# Patient Record
Sex: MALE | Race: WHITE | NOT HISPANIC OR LATINO | Employment: OTHER | URBAN - NONMETROPOLITAN AREA
[De-identification: names, ages, dates, MRNs, and addresses within clinical notes are randomized per-mention and may not be internally consistent; named-entity substitution may affect disease eponyms.]

---

## 2022-07-07 PROCEDURE — 93321 DOPPLER ECHO F-UP/LMTD STD: CPT | Performed by: INTERNAL MEDICINE

## 2022-07-07 PROCEDURE — 93325 DOPPLER ECHO COLOR FLOW MAPG: CPT | Performed by: INTERNAL MEDICINE

## 2022-07-07 PROCEDURE — 93308 TTE F-UP OR LMTD: CPT | Performed by: INTERNAL MEDICINE

## 2022-07-26 ENCOUNTER — OUTSIDE FACILITY SERVICE (OUTPATIENT)
Dept: CARDIOLOGY | Facility: CLINIC | Age: 72
End: 2022-07-26

## 2023-01-20 ENCOUNTER — HOSPITAL ENCOUNTER (INPATIENT)
Facility: HOSPITAL | Age: 73
LOS: 3 days | Discharge: HOME | DRG: 244 | End: 2023-01-24
Attending: EMERGENCY MEDICINE | Admitting: INTERNAL MEDICINE
Payer: MEDICARE

## 2023-01-20 ENCOUNTER — APPOINTMENT (OUTPATIENT)
Dept: GENERAL RADIOLOGY | Facility: HOSPITAL | Age: 73
DRG: 244 | End: 2023-01-20
Payer: MEDICARE

## 2023-01-20 ENCOUNTER — APPOINTMENT (OUTPATIENT)
Dept: CT IMAGING | Facility: HOSPITAL | Age: 73
DRG: 244 | End: 2023-01-20
Payer: MEDICARE

## 2023-01-20 DIAGNOSIS — R00.1 SYMPTOMATIC BRADYCARDIA: ICD-10-CM

## 2023-01-20 DIAGNOSIS — I44.1 AV BLOCK, MOBITZ 2: Primary | ICD-10-CM

## 2023-01-20 PROBLEM — I10 HTN (HYPERTENSION): Status: ACTIVE | Noted: 2023-01-20

## 2023-01-20 PROBLEM — G47.33 OSA (OBSTRUCTIVE SLEEP APNEA): Status: ACTIVE | Noted: 2023-01-20

## 2023-01-20 PROBLEM — I65.22 CAROTID ARTERY STENOSIS, SYMPTOMATIC, LEFT: Status: ACTIVE | Noted: 2023-01-20

## 2023-01-20 PROBLEM — E66.9 OBESE: Status: ACTIVE | Noted: 2023-01-20

## 2023-01-20 PROBLEM — E78.2 MIXED HYPERLIPIDEMIA: Status: ACTIVE | Noted: 2023-01-20

## 2023-01-20 LAB
ALBUMIN SERPL-MCNC: 4.7 G/DL (ref 3.5–5.2)
ALBUMIN/GLOB SERPL: 1.4 G/DL
ALP SERPL-CCNC: 89 U/L (ref 39–117)
ALT SERPL W P-5'-P-CCNC: 23 U/L (ref 1–41)
ANION GAP SERPL CALCULATED.3IONS-SCNC: 14 MMOL/L (ref 5–15)
AST SERPL-CCNC: 23 U/L (ref 1–40)
BASOPHILS # BLD AUTO: 0.03 10*3/MM3 (ref 0–0.2)
BASOPHILS NFR BLD AUTO: 0.4 % (ref 0–1.5)
BILIRUB SERPL-MCNC: 0.7 MG/DL (ref 0–1.2)
BUN SERPL-MCNC: 16 MG/DL (ref 8–23)
BUN/CREAT SERPL: 14.8 (ref 7–25)
CALCIUM SPEC-SCNC: 9.8 MG/DL (ref 8.6–10.5)
CHLORIDE SERPL-SCNC: 100 MMOL/L (ref 98–107)
CO2 SERPL-SCNC: 26 MMOL/L (ref 22–29)
CREAT SERPL-MCNC: 1.08 MG/DL (ref 0.76–1.27)
DEPRECATED RDW RBC AUTO: 43.8 FL (ref 37–54)
EGFRCR SERPLBLD CKD-EPI 2021: 73.8 ML/MIN/1.73
EOSINOPHIL # BLD AUTO: 0.13 10*3/MM3 (ref 0–0.4)
EOSINOPHIL NFR BLD AUTO: 1.7 % (ref 0.3–6.2)
ERYTHROCYTE [DISTWIDTH] IN BLOOD BY AUTOMATED COUNT: 12.6 % (ref 12.3–15.4)
GLOBULIN UR ELPH-MCNC: 3.4 GM/DL
GLUCOSE SERPL-MCNC: 98 MG/DL (ref 65–99)
HCT VFR BLD AUTO: 43.6 % (ref 37.5–51)
HGB BLD-MCNC: 14.4 G/DL (ref 13–17.7)
HOLD SPECIMEN: NORMAL
IMM GRANULOCYTES # BLD AUTO: 0.03 10*3/MM3 (ref 0–0.05)
IMM GRANULOCYTES NFR BLD AUTO: 0.4 % (ref 0–0.5)
LYMPHOCYTES # BLD AUTO: 2.24 10*3/MM3 (ref 0.7–3.1)
LYMPHOCYTES NFR BLD AUTO: 29.1 % (ref 19.6–45.3)
MAGNESIUM SERPL-MCNC: 1.9 MG/DL (ref 1.6–2.4)
MCH RBC QN AUTO: 31.3 PG (ref 26.6–33)
MCHC RBC AUTO-ENTMCNC: 33 G/DL (ref 31.5–35.7)
MCV RBC AUTO: 94.8 FL (ref 79–97)
MONOCYTES # BLD AUTO: 0.84 10*3/MM3 (ref 0.1–0.9)
MONOCYTES NFR BLD AUTO: 10.9 % (ref 5–12)
NEUTROPHILS NFR BLD AUTO: 4.44 10*3/MM3 (ref 1.7–7)
NEUTROPHILS NFR BLD AUTO: 57.5 % (ref 42.7–76)
NRBC BLD AUTO-RTO: 0 /100 WBC (ref 0–0.2)
NT-PROBNP SERPL-MCNC: 459.2 PG/ML (ref 0–900)
PLATELET # BLD AUTO: 192 10*3/MM3 (ref 140–450)
PMV BLD AUTO: 10.4 FL (ref 6–12)
POTASSIUM SERPL-SCNC: 4.3 MMOL/L (ref 3.5–5.2)
PROT SERPL-MCNC: 8.1 G/DL (ref 6–8.5)
QT INTERVAL: 480 MS
QTC INTERVAL: 376 MS
RBC # BLD AUTO: 4.6 10*6/MM3 (ref 4.14–5.8)
SODIUM SERPL-SCNC: 140 MMOL/L (ref 136–145)
T4 FREE SERPL-MCNC: 1.18 NG/DL (ref 0.93–1.7)
TROPONIN T SERPL-MCNC: <0.01 NG/ML (ref 0–0.03)
TSH SERPL DL<=0.05 MIU/L-ACNC: 2.23 UIU/ML (ref 0.27–4.2)
WBC NRBC COR # BLD: 7.71 10*3/MM3 (ref 3.4–10.8)
WHOLE BLOOD HOLD COAG: NORMAL
WHOLE BLOOD HOLD SPECIMEN: NORMAL

## 2023-01-20 PROCEDURE — 4A03X5D MEASUREMENT OF ARTERIAL FLOW, INTRACRANIAL, EXTERNAL APPROACH: ICD-10-PCS | Performed by: STUDENT IN AN ORGANIZED HEALTH CARE EDUCATION/TRAINING PROGRAM

## 2023-01-20 PROCEDURE — 84443 ASSAY THYROID STIM HORMONE: CPT | Performed by: EMERGENCY MEDICINE

## 2023-01-20 PROCEDURE — G0378 HOSPITAL OBSERVATION PER HR: HCPCS

## 2023-01-20 PROCEDURE — 93005 ELECTROCARDIOGRAM TRACING: CPT

## 2023-01-20 PROCEDURE — 83880 ASSAY OF NATRIURETIC PEPTIDE: CPT | Performed by: EMERGENCY MEDICINE

## 2023-01-20 PROCEDURE — 84439 ASSAY OF FREE THYROXINE: CPT | Performed by: EMERGENCY MEDICINE

## 2023-01-20 PROCEDURE — 70450 CT HEAD/BRAIN W/O DYE: CPT

## 2023-01-20 PROCEDURE — 99223 1ST HOSP IP/OBS HIGH 75: CPT | Performed by: PHYSICIAN ASSISTANT

## 2023-01-20 PROCEDURE — 85025 COMPLETE CBC W/AUTO DIFF WBC: CPT | Performed by: EMERGENCY MEDICINE

## 2023-01-20 PROCEDURE — 70498 CT ANGIOGRAPHY NECK: CPT

## 2023-01-20 PROCEDURE — 70496 CT ANGIOGRAPHY HEAD: CPT

## 2023-01-20 PROCEDURE — 93005 ELECTROCARDIOGRAM TRACING: CPT | Performed by: EMERGENCY MEDICINE

## 2023-01-20 PROCEDURE — 83735 ASSAY OF MAGNESIUM: CPT | Performed by: EMERGENCY MEDICINE

## 2023-01-20 PROCEDURE — 99285 EMERGENCY DEPT VISIT HI MDM: CPT

## 2023-01-20 PROCEDURE — 36415 COLL VENOUS BLD VENIPUNCTURE: CPT

## 2023-01-20 PROCEDURE — 80053 COMPREHEN METABOLIC PANEL: CPT | Performed by: EMERGENCY MEDICINE

## 2023-01-20 PROCEDURE — 0 IOPAMIDOL PER 1 ML: Performed by: EMERGENCY MEDICINE

## 2023-01-20 PROCEDURE — 71045 X-RAY EXAM CHEST 1 VIEW: CPT

## 2023-01-20 PROCEDURE — 84484 ASSAY OF TROPONIN QUANT: CPT | Performed by: EMERGENCY MEDICINE

## 2023-01-20 RX ORDER — SODIUM CHLORIDE 0.9 % (FLUSH) 0.9 %
10 SYRINGE (ML) INJECTION AS NEEDED
Status: DISCONTINUED | OUTPATIENT
Start: 2023-01-20 | End: 2023-01-24 | Stop reason: HOSPADM

## 2023-01-20 RX ORDER — CEFAZOLIN SODIUM 2 G/100ML
2 INJECTION, SOLUTION INTRAVENOUS ONCE
Status: COMPLETED | OUTPATIENT
Start: 2023-01-23 | End: 2023-01-23

## 2023-01-20 RX ORDER — LISINOPRIL 40 MG/1
40 TABLET ORAL DAILY
COMMUNITY

## 2023-01-20 RX ORDER — ONDANSETRON 2 MG/ML
4 INJECTION INTRAMUSCULAR; INTRAVENOUS EVERY 6 HOURS PRN
Status: DISCONTINUED | OUTPATIENT
Start: 2023-01-20 | End: 2023-01-24 | Stop reason: HOSPADM

## 2023-01-20 RX ORDER — NITROGLYCERIN 0.4 MG/1
0.4 TABLET SUBLINGUAL
Status: DISCONTINUED | OUTPATIENT
Start: 2023-01-20 | End: 2023-01-24 | Stop reason: HOSPADM

## 2023-01-20 RX ORDER — ASPIRIN 325 MG
325 TABLET ORAL DAILY
COMMUNITY

## 2023-01-20 RX ORDER — OMEPRAZOLE 20 MG/1
20 CAPSULE, DELAYED RELEASE ORAL DAILY
COMMUNITY

## 2023-01-20 RX ORDER — SODIUM CHLORIDE 9 MG/ML
40 INJECTION, SOLUTION INTRAVENOUS AS NEEDED
Status: DISCONTINUED | OUTPATIENT
Start: 2023-01-20 | End: 2023-01-24 | Stop reason: HOSPADM

## 2023-01-20 RX ORDER — PRAVASTATIN SODIUM 20 MG
20 TABLET ORAL DAILY
COMMUNITY

## 2023-01-20 RX ORDER — SODIUM CHLORIDE 0.9 % (FLUSH) 0.9 %
3 SYRINGE (ML) INJECTION EVERY 12 HOURS SCHEDULED
Status: DISCONTINUED | OUTPATIENT
Start: 2023-01-20 | End: 2023-01-24 | Stop reason: HOSPADM

## 2023-01-20 RX ORDER — ACETAMINOPHEN 325 MG/1
650 TABLET ORAL EVERY 4 HOURS PRN
Status: DISCONTINUED | OUTPATIENT
Start: 2023-01-20 | End: 2023-01-23 | Stop reason: SDUPTHER

## 2023-01-20 RX ORDER — CEFAZOLIN SODIUM 2 G/100ML
2 INJECTION, SOLUTION INTRAVENOUS ONCE
Status: DISCONTINUED | OUTPATIENT
Start: 2023-01-20 | End: 2023-01-20

## 2023-01-20 RX ADMIN — Medication 3 ML: at 21:18

## 2023-01-20 RX ADMIN — Medication 10 ML: at 20:37

## 2023-01-20 RX ADMIN — IOPAMIDOL 75 ML: 755 INJECTION, SOLUTION INTRAVENOUS at 15:03

## 2023-01-21 PROCEDURE — 99232 SBSQ HOSP IP/OBS MODERATE 35: CPT | Performed by: STUDENT IN AN ORGANIZED HEALTH CARE EDUCATION/TRAINING PROGRAM

## 2023-01-21 RX ORDER — ASPIRIN 81 MG/1
81 TABLET ORAL ONCE
Status: COMPLETED | OUTPATIENT
Start: 2023-01-21 | End: 2023-01-21

## 2023-01-21 RX ORDER — PRAVASTATIN SODIUM 20 MG
20 TABLET ORAL NIGHTLY
Status: COMPLETED | OUTPATIENT
Start: 2023-01-21 | End: 2023-01-21

## 2023-01-21 RX ORDER — LISINOPRIL 40 MG/1
40 TABLET ORAL
Status: COMPLETED | OUTPATIENT
Start: 2023-01-21 | End: 2023-01-21

## 2023-01-21 RX ADMIN — Medication 3 ML: at 21:09

## 2023-01-21 RX ADMIN — SERTRALINE HYDROCHLORIDE 50 MG: 50 TABLET ORAL at 21:07

## 2023-01-21 RX ADMIN — PRAVASTATIN SODIUM 20 MG: 20 TABLET ORAL at 21:07

## 2023-01-21 RX ADMIN — Medication 3 ML: at 08:07

## 2023-01-21 RX ADMIN — LISINOPRIL 40 MG: 40 TABLET ORAL at 21:07

## 2023-01-21 RX ADMIN — ASPIRIN 81 MG: 81 TABLET, COATED ORAL at 21:07

## 2023-01-22 ENCOUNTER — APPOINTMENT (OUTPATIENT)
Dept: CARDIOLOGY | Facility: HOSPITAL | Age: 73
DRG: 244 | End: 2023-01-22
Payer: MEDICARE

## 2023-01-22 LAB
BH CV ECHO MEAS - AO MAX PG: 15.4 MMHG
BH CV ECHO MEAS - AO MEAN PG: 8.2 MMHG
BH CV ECHO MEAS - AO ROOT DIAM: 3.8 CM
BH CV ECHO MEAS - AO V2 MAX: 196.4 CM/SEC
BH CV ECHO MEAS - AO V2 VTI: 50.1 CM
BH CV ECHO MEAS - AVA(I,D): 2.21 CM2
BH CV ECHO MEAS - EDV(CUBED): 144.4 ML
BH CV ECHO MEAS - EDV(MOD-SP2): 125.8 ML
BH CV ECHO MEAS - EDV(MOD-SP4): 125.5 ML
BH CV ECHO MEAS - EF(MOD-BP): 65 %
BH CV ECHO MEAS - EF(MOD-SP2): 71.4 %
BH CV ECHO MEAS - EF(MOD-SP4): 61.4 %
BH CV ECHO MEAS - ESV(CUBED): 62.1 ML
BH CV ECHO MEAS - ESV(MOD-SP2): 36 ML
BH CV ECHO MEAS - ESV(MOD-SP4): 48.5 ML
BH CV ECHO MEAS - FS: 24.5 %
BH CV ECHO MEAS - IVS/LVPW: 0.99 CM
BH CV ECHO MEAS - IVSD: 1.2 CM
BH CV ECHO MEAS - LA DIMENSION: 4.9 CM
BH CV ECHO MEAS - LAT PEAK E' VEL: 15 CM/SEC
BH CV ECHO MEAS - LV DIASTOLIC VOL/BSA (35-75): 54.1 CM2
BH CV ECHO MEAS - LV MASS(C)D: 253.5 GRAMS
BH CV ECHO MEAS - LV MAX PG: 7.6 MMHG
BH CV ECHO MEAS - LV MEAN PG: 3.7 MMHG
BH CV ECHO MEAS - LV SYSTOLIC VOL/BSA (12-30): 20.9 CM2
BH CV ECHO MEAS - LV V1 MAX: 138 CM/SEC
BH CV ECHO MEAS - LV V1 VTI: 36.5 CM
BH CV ECHO MEAS - LVIDD: 5.2 CM
BH CV ECHO MEAS - LVIDS: 4 CM
BH CV ECHO MEAS - LVOT AREA: 3 CM2
BH CV ECHO MEAS - LVOT DIAM: 1.96 CM
BH CV ECHO MEAS - LVPWD: 1.21 CM
BH CV ECHO MEAS - MED PEAK E' VEL: 13 CM/SEC
BH CV ECHO MEAS - MV A MAX VEL: 53.5 CM/SEC
BH CV ECHO MEAS - MV DEC SLOPE: 471.5 CM/SEC2
BH CV ECHO MEAS - MV DEC TIME: 0.26 MSEC
BH CV ECHO MEAS - MV E MAX VEL: 120.6 CM/SEC
BH CV ECHO MEAS - MV E/A: 2.25
BH CV ECHO MEAS - MV MAX PG: 8.5 MMHG
BH CV ECHO MEAS - MV MEAN PG: 2.07 MMHG
BH CV ECHO MEAS - MV P1/2T: 75 MSEC
BH CV ECHO MEAS - MV V2 VTI: 69.4 CM
BH CV ECHO MEAS - MVA(VTI): 1.6 CM2
BH CV ECHO MEAS - PA ACC TIME: 0.21 SEC
BH CV ECHO MEAS - PA PR(ACCEL): -15.2 MMHG
BH CV ECHO MEAS - PA V2 MAX: 115.1 CM/SEC
BH CV ECHO MEAS - RAP SYSTOLE: 3 MMHG
BH CV ECHO MEAS - RVSP: 26.8 MMHG
BH CV ECHO MEAS - SI(MOD-SP2): 38.7 ML/M2
BH CV ECHO MEAS - SI(MOD-SP4): 33.2 ML/M2
BH CV ECHO MEAS - SV(LVOT): 110.8 ML
BH CV ECHO MEAS - SV(MOD-SP2): 89.8 ML
BH CV ECHO MEAS - SV(MOD-SP4): 77 ML
BH CV ECHO MEAS - TAPSE (>1.6): 2.6 CM
BH CV ECHO MEAS - TR MAX PG: 23.8 MMHG
BH CV ECHO MEAS - TR MAX VEL: 244.1 CM/SEC
BH CV ECHO MEASUREMENTS AVERAGE E/E' RATIO: 8.61
BH CV VAS BP LEFT ARM: NORMAL MMHG
BH CV XLRA - RV BASE: 3.9 CM
BH CV XLRA - RV LENGTH: 6.8 CM
BH CV XLRA - RV MID: 3.5 CM
BH CV XLRA - TDI S': 11 CM/SEC
IVRT: 88 MSEC
LEFT ATRIUM VOLUME INDEX: 34.9 ML/M2
MAXIMAL PREDICTED HEART RATE: 150 BPM
STRESS TARGET HR: 128 BPM

## 2023-01-22 PROCEDURE — 99232 SBSQ HOSP IP/OBS MODERATE 35: CPT | Performed by: STUDENT IN AN ORGANIZED HEALTH CARE EDUCATION/TRAINING PROGRAM

## 2023-01-22 PROCEDURE — 93306 TTE W/DOPPLER COMPLETE: CPT | Performed by: INTERNAL MEDICINE

## 2023-01-22 PROCEDURE — 93306 TTE W/DOPPLER COMPLETE: CPT

## 2023-01-22 RX ADMIN — Medication 3 ML: at 20:20

## 2023-01-22 RX ADMIN — Medication 3 ML: at 08:37

## 2023-01-23 ENCOUNTER — APPOINTMENT (OUTPATIENT)
Dept: GENERAL RADIOLOGY | Facility: HOSPITAL | Age: 73
DRG: 244 | End: 2023-01-23
Payer: MEDICARE

## 2023-01-23 PROCEDURE — C1898 LEAD, PMKR, OTHER THAN TRANS: HCPCS | Performed by: STUDENT IN AN ORGANIZED HEALTH CARE EDUCATION/TRAINING PROGRAM

## 2023-01-23 PROCEDURE — C1892 INTRO/SHEATH,FIXED,PEEL-AWAY: HCPCS | Performed by: STUDENT IN AN ORGANIZED HEALTH CARE EDUCATION/TRAINING PROGRAM

## 2023-01-23 PROCEDURE — 93005 ELECTROCARDIOGRAM TRACING: CPT | Performed by: STUDENT IN AN ORGANIZED HEALTH CARE EDUCATION/TRAINING PROGRAM

## 2023-01-23 PROCEDURE — 33208 INSRT HEART PM ATRIAL & VENT: CPT | Performed by: STUDENT IN AN ORGANIZED HEALTH CARE EDUCATION/TRAINING PROGRAM

## 2023-01-23 PROCEDURE — C1785 PMKR, DUAL, RATE-RESP: HCPCS | Performed by: STUDENT IN AN ORGANIZED HEALTH CARE EDUCATION/TRAINING PROGRAM

## 2023-01-23 PROCEDURE — 71046 X-RAY EXAM CHEST 2 VIEWS: CPT

## 2023-01-23 PROCEDURE — 99152 MOD SED SAME PHYS/QHP 5/>YRS: CPT | Performed by: STUDENT IN AN ORGANIZED HEALTH CARE EDUCATION/TRAINING PROGRAM

## 2023-01-23 PROCEDURE — 25010000002 MIDAZOLAM PER 1 MG: Performed by: STUDENT IN AN ORGANIZED HEALTH CARE EDUCATION/TRAINING PROGRAM

## 2023-01-23 PROCEDURE — 02HK3JZ INSERTION OF PACEMAKER LEAD INTO RIGHT VENTRICLE, PERCUTANEOUS APPROACH: ICD-10-PCS | Performed by: STUDENT IN AN ORGANIZED HEALTH CARE EDUCATION/TRAINING PROGRAM

## 2023-01-23 PROCEDURE — 02H63JZ INSERTION OF PACEMAKER LEAD INTO RIGHT ATRIUM, PERCUTANEOUS APPROACH: ICD-10-PCS | Performed by: STUDENT IN AN ORGANIZED HEALTH CARE EDUCATION/TRAINING PROGRAM

## 2023-01-23 PROCEDURE — 25010000002 CEFAZOLIN IN DEXTROSE 2-4 GM/100ML-% SOLUTION: Performed by: PHYSICIAN ASSISTANT

## 2023-01-23 PROCEDURE — 0JH606Z INSERTION OF PACEMAKER, DUAL CHAMBER INTO CHEST SUBCUTANEOUS TISSUE AND FASCIA, OPEN APPROACH: ICD-10-PCS | Performed by: STUDENT IN AN ORGANIZED HEALTH CARE EDUCATION/TRAINING PROGRAM

## 2023-01-23 PROCEDURE — 25010000002 ONDANSETRON PER 1 MG: Performed by: STUDENT IN AN ORGANIZED HEALTH CARE EDUCATION/TRAINING PROGRAM

## 2023-01-23 PROCEDURE — 99153 MOD SED SAME PHYS/QHP EA: CPT | Performed by: STUDENT IN AN ORGANIZED HEALTH CARE EDUCATION/TRAINING PROGRAM

## 2023-01-23 PROCEDURE — 25010000002 FENTANYL CITRATE (PF) 50 MCG/ML SOLUTION: Performed by: STUDENT IN AN ORGANIZED HEALTH CARE EDUCATION/TRAINING PROGRAM

## 2023-01-23 DEVICE — LD PM TENDRIL STS 6F58CM 2088TC58: Type: IMPLANTABLE DEVICE | Site: HEART | Status: FUNCTIONAL

## 2023-01-23 DEVICE — LD PM TENDRIL STS 6F52CM 2088TC52: Type: IMPLANTABLE DEVICE | Site: HEART | Status: FUNCTIONAL

## 2023-01-23 DEVICE — GEN PM ASSURITY MRI DR RF PM2272: Type: IMPLANTABLE DEVICE | Site: HEART | Status: FUNCTIONAL

## 2023-01-23 RX ORDER — SODIUM CHLORIDE 9 MG/ML
INJECTION, SOLUTION INTRAVENOUS
Status: COMPLETED | OUTPATIENT
Start: 2023-01-23 | End: 2023-01-23

## 2023-01-23 RX ORDER — PRAVASTATIN SODIUM 20 MG
20 TABLET ORAL NIGHTLY
Status: DISCONTINUED | OUTPATIENT
Start: 2023-01-23 | End: 2023-01-24 | Stop reason: HOSPADM

## 2023-01-23 RX ORDER — PANTOPRAZOLE SODIUM 40 MG/1
40 TABLET, DELAYED RELEASE ORAL
Status: DISCONTINUED | OUTPATIENT
Start: 2023-01-24 | End: 2023-01-24 | Stop reason: HOSPADM

## 2023-01-23 RX ORDER — SODIUM CHLORIDE 0.9 % (FLUSH) 0.9 %
10 SYRINGE (ML) INJECTION AS NEEDED
Status: DISCONTINUED | OUTPATIENT
Start: 2023-01-23 | End: 2023-01-24 | Stop reason: HOSPADM

## 2023-01-23 RX ORDER — ACETAMINOPHEN 325 MG/1
650 TABLET ORAL EVERY 4 HOURS PRN
Status: DISCONTINUED | OUTPATIENT
Start: 2023-01-23 | End: 2023-01-24 | Stop reason: HOSPADM

## 2023-01-23 RX ORDER — ACETAMINOPHEN 650 MG/1
650 SUPPOSITORY RECTAL EVERY 4 HOURS PRN
Status: DISCONTINUED | OUTPATIENT
Start: 2023-01-23 | End: 2023-01-24 | Stop reason: HOSPADM

## 2023-01-23 RX ORDER — ONDANSETRON 2 MG/ML
INJECTION INTRAMUSCULAR; INTRAVENOUS
Status: DISCONTINUED | OUTPATIENT
Start: 2023-01-23 | End: 2023-01-23 | Stop reason: HOSPADM

## 2023-01-23 RX ORDER — BUPIVACAINE HYDROCHLORIDE 2.5 MG/ML
INJECTION, SOLUTION INFILTRATION; PERINEURAL
Status: DISCONTINUED | OUTPATIENT
Start: 2023-01-23 | End: 2023-01-23 | Stop reason: HOSPADM

## 2023-01-23 RX ORDER — LISINOPRIL 40 MG/1
40 TABLET ORAL DAILY
Status: DISCONTINUED | OUTPATIENT
Start: 2023-01-24 | End: 2023-01-24 | Stop reason: HOSPADM

## 2023-01-23 RX ORDER — SODIUM CHLORIDE 9 MG/ML
40 INJECTION, SOLUTION INTRAVENOUS AS NEEDED
Status: DISCONTINUED | OUTPATIENT
Start: 2023-01-23 | End: 2023-01-24 | Stop reason: HOSPADM

## 2023-01-23 RX ORDER — SODIUM CHLORIDE 0.9 % (FLUSH) 0.9 %
3 SYRINGE (ML) INJECTION EVERY 12 HOURS SCHEDULED
Status: DISCONTINUED | OUTPATIENT
Start: 2023-01-23 | End: 2023-01-24 | Stop reason: HOSPADM

## 2023-01-23 RX ORDER — MIDAZOLAM HYDROCHLORIDE 1 MG/ML
INJECTION INTRAMUSCULAR; INTRAVENOUS
Status: DISCONTINUED | OUTPATIENT
Start: 2023-01-23 | End: 2023-01-23 | Stop reason: HOSPADM

## 2023-01-23 RX ORDER — FENTANYL CITRATE 50 UG/ML
INJECTION, SOLUTION INTRAMUSCULAR; INTRAVENOUS
Status: DISCONTINUED | OUTPATIENT
Start: 2023-01-23 | End: 2023-01-23 | Stop reason: HOSPADM

## 2023-01-23 RX ORDER — LIDOCAINE HYDROCHLORIDE 5 MG/ML
INJECTION, SOLUTION INFILTRATION; PERINEURAL
Status: DISCONTINUED | OUTPATIENT
Start: 2023-01-23 | End: 2023-01-23 | Stop reason: HOSPADM

## 2023-01-23 RX ORDER — ASPIRIN 325 MG
162.5 TABLET ORAL DAILY
Status: DISCONTINUED | OUTPATIENT
Start: 2023-01-24 | End: 2023-01-24 | Stop reason: HOSPADM

## 2023-01-23 RX ORDER — LISINOPRIL 40 MG/1
40 TABLET ORAL ONCE
Status: COMPLETED | OUTPATIENT
Start: 2023-01-23 | End: 2023-01-23

## 2023-01-23 RX ADMIN — LISINOPRIL 40 MG: 40 TABLET ORAL at 20:55

## 2023-01-23 RX ADMIN — Medication 3 ML: at 20:57

## 2023-01-23 RX ADMIN — CEFAZOLIN SODIUM 2 G: 2 INJECTION, SOLUTION INTRAVENOUS at 17:33

## 2023-01-23 RX ADMIN — Medication 10 ML: at 20:56

## 2023-01-23 RX ADMIN — Medication 3 ML: at 20:56

## 2023-01-23 RX ADMIN — PRAVASTATIN SODIUM 20 MG: 20 TABLET ORAL at 20:55

## 2023-01-24 ENCOUNTER — READMISSION MANAGEMENT (OUTPATIENT)
Dept: CALL CENTER | Facility: HOSPITAL | Age: 73
End: 2023-01-24
Payer: MEDICARE

## 2023-01-24 VITALS
TEMPERATURE: 98.6 F | BODY MASS INDEX: 36.65 KG/M2 | HEIGHT: 70 IN | WEIGHT: 256 LBS | DIASTOLIC BLOOD PRESSURE: 81 MMHG | SYSTOLIC BLOOD PRESSURE: 138 MMHG | OXYGEN SATURATION: 95 % | RESPIRATION RATE: 18 BRPM | HEART RATE: 69 BPM

## 2023-01-24 LAB
QT INTERVAL: 0 MS
QTC INTERVAL: 0 MS

## 2023-01-24 PROCEDURE — 93005 ELECTROCARDIOGRAM TRACING: CPT | Performed by: INTERNAL MEDICINE

## 2023-01-24 RX ADMIN — LISINOPRIL 40 MG: 40 TABLET ORAL at 09:48

## 2023-01-24 RX ADMIN — ASPIRIN 162.5 MG: 325 TABLET ORAL at 09:46

## 2023-01-24 RX ADMIN — SERTRALINE HYDROCHLORIDE 50 MG: 50 TABLET ORAL at 09:46

## 2023-01-24 RX ADMIN — PANTOPRAZOLE SODIUM 40 MG: 40 TABLET, DELAYED RELEASE ORAL at 05:52

## 2023-01-25 NOTE — OUTREACH NOTE
Prep Survey    Flowsheet Row Responses   Religion facility patient discharged from? [facility]   Is LACE score < 7 ? No   Eligibility Readm Mgmt   Discharge diagnosis Mobitz type 2 second degree heart block   Does the patient have one of the following disease processes/diagnoses(primary or secondary)? Other   Does the patient have Home health ordered? No   Is there a DME ordered? No   Prep survey completed? Yes          KONSTANTIN JULIAN - Registered Nurse

## 2023-01-26 ENCOUNTER — READMISSION MANAGEMENT (OUTPATIENT)
Dept: CALL CENTER | Facility: HOSPITAL | Age: 73
End: 2023-01-26
Payer: MEDICARE

## 2023-01-26 NOTE — OUTREACH NOTE
Medical Week 1 Survey    Flowsheet Row Responses   [facility] patient discharged from? [place]   Does the patient have one of the following disease processes/diagnoses(primary or secondary)? Other   Week 1 attempt successful? No   Unsuccessful attempts Attempt 1          TIN PINA - Registered Nurse

## 2023-02-02 ENCOUNTER — READMISSION MANAGEMENT (OUTPATIENT)
Dept: CALL CENTER | Facility: HOSPITAL | Age: 73
End: 2023-02-02
Payer: MEDICARE

## 2023-02-02 LAB
QT INTERVAL: 438 MS
QTC INTERVAL: 475 MS

## 2023-02-02 NOTE — OUTREACH NOTE
Medical Week 2 Survey    Flowsheet Row Responses   [facility] patient discharged from? [place]   Does the patient have one of the following disease processes/diagnoses(primary or secondary)? Other   Week 2 attempt successful? No   Unsuccessful attempts Attempt 1          COLIN PINA - Registered Nurse

## 2023-02-03 ENCOUNTER — OFFICE VISIT (OUTPATIENT)
Dept: CARDIOLOGY | Facility: CLINIC | Age: 73
End: 2023-02-03
Payer: MEDICARE

## 2023-02-03 DIAGNOSIS — I65.22 CAROTID ARTERY STENOSIS, SYMPTOMATIC, LEFT: Primary | ICD-10-CM

## 2023-02-03 PROCEDURE — 99024 POSTOP FOLLOW-UP VISIT: CPT | Performed by: INTERNAL MEDICINE

## 2023-02-03 NOTE — PROGRESS NOTES
02/03/2023    Name: Tristin Cantu  YOB: 1950    David Maddox, DO    Patient has fever: [] YES   [x] NO     Temperature if indicated:  97.9    Wound Location:  Left Shoulder     Surgical Glue: intact     Wound Appearance: clear/intact     Plan: normal wound check      Did patient receive home monitoring box? [x] YES   [] NO  (If no, contact device clinic.)    Does patient have questions about remote monitoring? [] YES   [x] NO (If yes notify device clinic)      Notes:       Appointment for follow-up scheduled for 3 months post procedure []    Future Appointments   Date Time Provider Department Center   5/19/2023 10:30 AM [facility]          Reno Mosqueda MA, 02/03/23

## 2023-02-04 ENCOUNTER — OUTSIDE FACILITY SERVICE (OUTPATIENT)
Dept: CARDIOLOGY | Facility: CLINIC | Age: 73
End: 2023-02-04
Payer: MEDICARE

## 2023-02-04 PROCEDURE — 93227 XTRNL ECG REC<48 HR R&I: CPT | Performed by: INTERNAL MEDICINE

## 2023-02-07 ENCOUNTER — READMISSION MANAGEMENT (OUTPATIENT)
Dept: CALL CENTER | Facility: HOSPITAL | Age: 73
End: 2023-02-07
Payer: MEDICARE

## 2023-03-03 PROCEDURE — 93296 REM INTERROG EVL PM/IDS: CPT | Performed by: STUDENT IN AN ORGANIZED HEALTH CARE EDUCATION/TRAINING PROGRAM

## 2023-03-03 PROCEDURE — 93294 REM INTERROG EVL PM/LDLS PM: CPT | Performed by: STUDENT IN AN ORGANIZED HEALTH CARE EDUCATION/TRAINING PROGRAM

## 2023-09-01 PROCEDURE — 93296 REM INTERROG EVL PM/IDS: CPT | Performed by: STUDENT IN AN ORGANIZED HEALTH CARE EDUCATION/TRAINING PROGRAM

## 2023-09-01 PROCEDURE — 93294 REM INTERROG EVL PM/LDLS PM: CPT | Performed by: STUDENT IN AN ORGANIZED HEALTH CARE EDUCATION/TRAINING PROGRAM

## 2023-11-07 DIAGNOSIS — N39.3 STRESS INCONTINENCE OF URINE: ICD-10-CM

## 2023-11-07 DIAGNOSIS — N40.1 BENIGN PROSTATIC HYPERPLASIA WITH URINARY FREQUENCY: Primary | ICD-10-CM

## 2023-11-07 DIAGNOSIS — R35.0 BENIGN PROSTATIC HYPERPLASIA WITH URINARY FREQUENCY: Primary | ICD-10-CM

## 2023-11-07 RX ORDER — FINASTERIDE 5 MG/1
5 TABLET, FILM COATED ORAL DAILY
Qty: 90 TABLET | Refills: 10 | Status: SHIPPED | OUTPATIENT
Start: 2023-11-07

## 2023-11-07 RX ORDER — OXYBUTYNIN CHLORIDE 5 MG/1
5 TABLET, EXTENDED RELEASE ORAL DAILY
Qty: 90 TABLET | Refills: 10 | Status: SHIPPED | OUTPATIENT
Start: 2023-11-07

## 2023-11-07 RX ORDER — TAMSULOSIN HYDROCHLORIDE 0.4 MG/1
1 CAPSULE ORAL DAILY
Qty: 90 CAPSULE | Refills: 10 | Status: SHIPPED | OUTPATIENT
Start: 2023-11-07

## 2024-02-26 RX ORDER — LEVETIRACETAM 250 MG/1
250 TABLET ORAL 2 TIMES DAILY
Qty: 60 TABLET | Refills: 5 | Status: SHIPPED | OUTPATIENT
Start: 2024-02-26

## 2024-04-19 ENCOUNTER — OFFICE VISIT (OUTPATIENT)
Dept: CARDIOLOGY | Facility: CLINIC | Age: 74
End: 2024-04-19
Payer: MEDICARE

## 2024-04-19 VITALS
OXYGEN SATURATION: 98 % | SYSTOLIC BLOOD PRESSURE: 126 MMHG | DIASTOLIC BLOOD PRESSURE: 70 MMHG | WEIGHT: 250 LBS | HEIGHT: 71 IN | BODY MASS INDEX: 35 KG/M2 | HEART RATE: 76 BPM

## 2024-04-19 DIAGNOSIS — R06.09 DYSPNEA ON EXERTION: ICD-10-CM

## 2024-04-19 DIAGNOSIS — G47.33 OSA (OBSTRUCTIVE SLEEP APNEA): ICD-10-CM

## 2024-04-19 DIAGNOSIS — Z95.0 PRESENCE OF CARDIAC PACEMAKER: ICD-10-CM

## 2024-04-19 DIAGNOSIS — I44.2 AV BLOCK, COMPLETE: Primary | ICD-10-CM

## 2024-04-19 DIAGNOSIS — R53.83 OTHER FATIGUE: ICD-10-CM

## 2024-04-19 PROBLEM — I44.1 AV BLOCK, MOBITZ 2: Status: RESOLVED | Noted: 2023-01-20 | Resolved: 2024-04-19

## 2024-04-19 PROBLEM — I44.1 MOBITZ TYPE 2 SECOND DEGREE HEART BLOCK: Status: RESOLVED | Noted: 2023-01-20 | Resolved: 2024-04-19

## 2024-04-19 NOTE — PROGRESS NOTES
Cardiac Electrophysiology Outpatient Note  [facility]    Office Visit     Tristin Cantu  [id number]  04/19/2024    Primary Care Physician: Jolene Amin MD    Referred By: No ref. provider found    Subjective     Chief Complaint   Patient presents with    Sleep Apnea       History of Present Illness:   Tristin Cantu is a 73 y.o. male who presents to my electrophysiology clinic for follow up of 2nd Degree AVB with PM check . Since we last saw the pt, he had PM implanted 1/2023 after being admitted with presyncope and found to have 2nd degree AVB. He has done better since that time.  Since we last saw the patient in July 2023, he said his big toe amputated about 4 months ago.  This was due to some sort of malformation like hammertoe rather than diabetes or peripheral vascular disease.  He reports he has had more shortness of breath on exertion and fatigue recently.  Since he has been having these issues with his toe he has not been exercising over the last 6 months or so.  He admits that his shortness of breath could be from deconditioning.  He denies any chest pain, palpitations lower extremity edema, or syncopal episodes.    Past Medical History:   Diagnosis Date    Abnormal ECG 2023    Cancer        Past Surgical History:   Procedure Laterality Date    CARDIAC ELECTROPHYSIOLOGY PROCEDURE N/A 01/23/2023    Procedure: Pacemaker DC new;  Surgeon: Rick Villatoro MD;  Location: [facility] INVASIVE LOCATION;  Service: Cardiology;  Laterality: N/A;    CATARACT EXTRACTION      both eyes 2024    COLONOSCOPY  06/2023    INSERT / REPLACE / REMOVE PACEMAKER  2023    TOE SURGERY Left 12/2023    @ [facility]       Family History   Problem Relation Age of Onset    Cancer Mother     Cancer Father        Social History     Socioeconomic History    Marital status:    Tobacco Use    Smoking status: Never     Passive exposure: Never    Smokeless tobacco: Never  "  Vaping Use    Vaping status: Never Used   Substance and Sexual Activity    Alcohol use: Yes     Alcohol/week: 9.0 standard drinks of alcohol     Types: 9 Cans of beer per week     Comment: occas    Drug use: Never    Sexual activity: Not Currently     Partners: Female     Birth control/protection: None         Current Outpatient Medications:     aspirin 325 MG tablet, Take 1 tablet by mouth Daily. Takes 1/2 tab, Disp: , Rfl:     esomeprazole (nexIUM) 20 MG capsule, Take 1 capsule by mouth Every Morning Before Breakfast., Disp: , Rfl:     finasteride (Proscar) 5 MG tablet, Take 1 tablet by mouth Daily., Disp: 90 tablet, Rfl: 10    fluticasone (FLONASE) 50 MCG/ACT nasal spray, SHAKE LIQUID AND USE 1 SPRAY IN EACH NOSTRIL TWICE DAILY AS DIRECTED, Disp: , Rfl:     furosemide (LASIX) 20 MG tablet, Take 1 tablet by mouth Daily., Disp: , Rfl:     levETIRAcetam (Keppra) 250 MG tablet, Take 1 tablet by mouth 2 (Two) Times a Day., Disp: 60 tablet, Rfl: 5    lisinopril (PRINIVIL,ZESTRIL) 40 MG tablet, Take 1 tablet by mouth Daily., Disp: , Rfl:     oxybutynin XL (DITROPAN-XL) 5 MG 24 hr tablet, Take 1 tablet by mouth Daily., Disp: 90 tablet, Rfl: 10    pravastatin (PRAVACHOL) 20 MG tablet, Take 1 tablet by mouth Daily., Disp: , Rfl:     sertraline (ZOLOFT) 50 MG tablet, Take 1 tablet by mouth Daily., Disp: , Rfl:     tamsulosin (FLOMAX) 0.4 MG capsule 24 hr capsule, Take 1 capsule by mouth Daily., Disp: 90 capsule, Rfl: 10    Allergies:   No Known Allergies    Objective   Vital Signs: Blood pressure 126/70, pulse 76, height 180.3 cm (71\"), weight 113 kg (250 lb), SpO2 98%.    PHYSICAL EXAM  General appearance: Awake, alert, cooperative  Head: Normocephalic, without obvious abnormality, atraumatic  Lungs: Clear to ascultation bilaterally  Heart: Regular rate and rhythm, no murmurs, no lower extremity swelling  Skin: Skin color, turgor normal, no rashes or lesions  Neurologic: Grossly normal     Lab Results   Component Value " "Date    GLUCOSE 98 01/20/2023    CALCIUM 9.8 01/20/2023     01/20/2023    K 4.3 01/20/2023    CO2 26.0 01/20/2023     01/20/2023    BUN 16 01/20/2023    CREATININE 1.08 01/20/2023    BCR 14.8 01/20/2023    ANIONGAP 14.0 01/20/2023     Lab Results   Component Value Date    WBC 7.71 01/20/2023    HGB 14.4 01/20/2023    HCT 43.6 01/20/2023    MCV 94.8 01/20/2023     01/20/2023     No results found for: \"INR\", \"PROTIME\"  Lab Results   Component Value Date    TSH 2.230 01/20/2023          Results for orders placed during the hospital encounter of 01/20/23    Adult Transthoracic Echo Complete W/ Cont if Necessary Per Protocol    Interpretation Summary    Left ventricular systolic function is normal. Calculated left ventricular EF = 65%    Left ventricular wall thickness is consistent with mild concentric hypertrophy.    Left ventricular diastolic function was normal.    Estimated right ventricular systolic pressure from tricuspid regurgitation is normal (<35 mmHg).         I personally viewed and interpreted the patient's EKG/Telemetry/lab data    Procedures    Tristin Cantu  reports that he has never smoked. He has never been exposed to tobacco smoke. He has never used smokeless tobacco.          Advance Care Planning   Advance Care Planning: ACP discussion was held with the patient during this visit. Patient does not have an advance directive, information provided.     Assessment & Plan    1. AV block, complete  S/p Saint Matt dual-chamber pacemaker 1/2023    2. Presence of cardiac pacemaker  Device check demonstrates a normally functioning dual-chamber Saint Matt pacemaker with 9 years of battery left, 3.4% atrial pacing, >99% ventricular pacing, normal threshold and impedance values, with mode switching <1% with longest 6 seconds all VT.      Patient has underlying complete AV block with basically 100% V-pacing. He complains of some slowly progressing dyspnea on exertion and fatigue over the last " year. Last echo around time of pacemaker implantation in 1/2023 showed normal LV systolic function. Will document repeat echo to ensure LV pacing-induced LV dysfunction is not causing his symptoms. It very well could be deconditioning around recent toe amputation or even untreated sleep apnea but would need to r/o LV dysfunction first.  - Adult Transthoracic Echo Complete W/ Cont if Necessary Per Protocol; Future    3. JUDIT (obstructive sleep apnea)  Patient has been noncompliant. He agreed to reach out to his PCP and old sleep medicine group to reestablish NIV therapy. This could be contributing to his symptoms of SoB and fatigue.       Follow Up:  Return in about 6 months (around 10/19/2024).      Thank you for allowing me to participate in the care of your patient. Please do not hesitate to contact me with additional questions or concerns.      Patel Stern PA-C  Cardiac Electrophysiology  [facility] / [facility]

## 2024-05-09 ENCOUNTER — HOSPITAL ENCOUNTER (OUTPATIENT)
Dept: CARDIOLOGY | Facility: HOSPITAL | Age: 74
Discharge: HOME | End: 2024-05-09
Payer: MEDICARE

## 2024-05-09 DIAGNOSIS — Z95.0 PRESENCE OF CARDIAC PACEMAKER: ICD-10-CM

## 2024-05-09 DIAGNOSIS — R53.83 OTHER FATIGUE: ICD-10-CM

## 2024-05-09 DIAGNOSIS — R06.09 DYSPNEA ON EXERTION: ICD-10-CM

## 2024-05-09 LAB
BH CV ECHO MEAS - ACS: 1.73 CM
BH CV ECHO MEAS - AO MAX PG: 10.9 MMHG
BH CV ECHO MEAS - AO MEAN PG: 6.2 MMHG
BH CV ECHO MEAS - AO ROOT DIAM: 3.4 CM
BH CV ECHO MEAS - AO V2 MAX: 165.2 CM/SEC
BH CV ECHO MEAS - AO V2 VTI: 37.1 CM
BH CV ECHO MEAS - AVA(I,D): 3 CM2
BH CV ECHO MEAS - EDV(CUBED): 118.4 ML
BH CV ECHO MEAS - EDV(MOD-SP4): 104 ML
BH CV ECHO MEAS - EF(MOD-SP4): 48.7 %
BH CV ECHO MEAS - EF_3D-VOL: 42 %
BH CV ECHO MEAS - ESV(CUBED): 52.5 ML
BH CV ECHO MEAS - ESV(MOD-SP4): 53.4 ML
BH CV ECHO MEAS - FS: 23.7 %
BH CV ECHO MEAS - IVS/LVPW: 1.06 CM
BH CV ECHO MEAS - IVSD: 1.54 CM
BH CV ECHO MEAS - LA DIMENSION: 5.1 CM
BH CV ECHO MEAS - LAT PEAK E' VEL: 6.5 CM/SEC
BH CV ECHO MEAS - LV DIASTOLIC VOL/BSA (35-75): 44.9 CM2
BH CV ECHO MEAS - LV MASS(C)D: 315 GRAMS
BH CV ECHO MEAS - LV MAX PG: 4.5 MMHG
BH CV ECHO MEAS - LV MEAN PG: 2.8 MMHG
BH CV ECHO MEAS - LV SYSTOLIC VOL/BSA (12-30): 23 CM2
BH CV ECHO MEAS - LV V1 MAX: 105.9 CM/SEC
BH CV ECHO MEAS - LV V1 VTI: 25.9 CM
BH CV ECHO MEAS - LVIDD: 4.9 CM
BH CV ECHO MEAS - LVIDS: 3.7 CM
BH CV ECHO MEAS - LVOT AREA: 4.2 CM2
BH CV ECHO MEAS - LVOT DIAM: 2.32 CM
BH CV ECHO MEAS - LVPWD: 1.46 CM
BH CV ECHO MEAS - MED PEAK E' VEL: 5.8 CM/SEC
BH CV ECHO MEAS - MV A MAX VEL: 104.5 CM/SEC
BH CV ECHO MEAS - MV DEC SLOPE: 311.4 CM/SEC2
BH CV ECHO MEAS - MV DEC TIME: 0.32 SEC
BH CV ECHO MEAS - MV E MAX VEL: 85.7 CM/SEC
BH CV ECHO MEAS - MV E/A: 0.82
BH CV ECHO MEAS - MV MAX PG: 7.2 MMHG
BH CV ECHO MEAS - MV MEAN PG: 2.6 MMHG
BH CV ECHO MEAS - MV P1/2T: 89.1 MSEC
BH CV ECHO MEAS - MV V2 VTI: 39.9 CM
BH CV ECHO MEAS - MVA(P1/2T): 2.47 CM2
BH CV ECHO MEAS - MVA(VTI): 2.7 CM2
BH CV ECHO MEAS - PA V2 MAX: 113 CM/SEC
BH CV ECHO MEAS - RV MAX PG: 2.9 MMHG
BH CV ECHO MEAS - RV V1 MAX: 85.3 CM/SEC
BH CV ECHO MEAS - RV V1 VTI: 22.2 CM
BH CV ECHO MEAS - RVDD: 3.2 CM
BH CV ECHO MEAS - SV(LVOT): 109.6 ML
BH CV ECHO MEAS - SV(MOD-SP4): 50.6 ML
BH CV ECHO MEAS - SVI(LVOT): 47.3 ML/M2
BH CV ECHO MEAS - SVI(MOD-SP4): 21.8 ML/M2
BH CV ECHO MEASUREMENTS AVERAGE E/E' RATIO: 13.93
LEFT ATRIUM VOLUME INDEX: 23.4 ML/M2

## 2024-05-09 PROCEDURE — 93306 TTE W/DOPPLER COMPLETE: CPT

## 2024-05-20 ENCOUNTER — TELEPHONE (OUTPATIENT)
Dept: CARDIOLOGY | Facility: CLINIC | Age: 74
End: 2024-05-20
Payer: MEDICARE

## 2024-05-20 NOTE — TELEPHONE ENCOUNTER
I tried to call the patient to discuss his echo results but did not get an answer. Left VM for patient to call the office back.

## 2024-05-21 NOTE — TELEPHONE ENCOUNTER
Patient notified and aware. He is going to f/u with his PCP and sleep medicine doctor. He has not been wearing his C-pap because it was recalled.

## 2024-09-12 PROCEDURE — 93294 REM INTERROG EVL PM/LDLS PM: CPT | Performed by: STUDENT IN AN ORGANIZED HEALTH CARE EDUCATION/TRAINING PROGRAM

## 2024-09-12 PROCEDURE — 93296 REM INTERROG EVL PM/IDS: CPT | Performed by: STUDENT IN AN ORGANIZED HEALTH CARE EDUCATION/TRAINING PROGRAM

## 2024-12-17 NOTE — PROGRESS NOTES
A-fib/a flutter was found on patient's device interrogation.  Called to speak to patient and spoke to his wife Rosa.  This is a new diagnosis for the patient.  Explained what atrial fibrillation is and how to manage it.  She stated that the patient is more short of breath with exertion.  She had not noticed fatigue, palpitations, lightheadedness, dizziness or syncope.  Discussed the importance of starting a blood thinner and she was agreeable.  Eliquis 5 mg twice daily called into pharmacy.  [id number] (age, HTN, CAD, CHF) patient will continue to take aspirin 81 mg.  Asked wife to notify us if the patient has any bleeding complications and to seek medical attention if he is ever falls and hits his head/is in an accident.  Patient has untreated sleep apnea at this time due to device being recalled.  Will send a referral to sleep medicine in [place] to help patient get reestablished.  Patient would also like to follow-up with Dr. Villatoro in [place].  Will work on moving his appointment up.    MAR Toth

## 2024-12-18 ENCOUNTER — TELEPHONE (OUTPATIENT)
Dept: CARDIOLOGY | Facility: CLINIC | Age: 74
End: 2024-12-18
Payer: MEDICARE

## 2024-12-18 DIAGNOSIS — R06.09 DYSPNEA ON EXERTION: ICD-10-CM

## 2024-12-18 DIAGNOSIS — G47.30 SLEEP APNEA, UNSPECIFIED TYPE: Primary | ICD-10-CM

## 2024-12-18 DIAGNOSIS — G47.33 OSA (OBSTRUCTIVE SLEEP APNEA): ICD-10-CM

## 2024-12-18 DIAGNOSIS — I1A.0 RESISTANT HYPERTENSION: ICD-10-CM

## 2024-12-18 DIAGNOSIS — R53.83 OTHER FATIGUE: Primary | ICD-10-CM

## 2024-12-18 NOTE — TELEPHONE ENCOUNTER
----- Message from Mildred Yip sent at 12/17/2024  2:04 PM EST -----  Regarding: Sleep medicine referral  This is a patient of Dr. Smith.  He was just diagnosed with atrial fibrillation and has untreated sleep apnea.  His wife states that they are no longer established with a sleep medicine doctor.  Are we able to send a referral to someone in [place] please?    Thanks,   Mildred

## 2024-12-18 NOTE — TELEPHONE ENCOUNTER
Referral placed to sleep medicine. I faxed it to [facility] in [place].    Patient's wife notified and aware.        (P)[phone]  (F)[phone]

## 2024-12-18 NOTE — TELEPHONE ENCOUNTER
----- Message from Mildred Yip sent at 12/17/2024  2:04 PM EST -----  Regarding: Sleep medicine referral  This is a patient of Dr. Smith.  He was just diagnosed with atrial fibrillation and has untreated sleep apnea.  His wife states that they are no longer established with a sleep medicine doctor.  Are we able to send a referral to someone in [place] please?    Thanks,   Mildred

## 2025-01-03 ENCOUNTER — OFFICE VISIT (OUTPATIENT)
Dept: CARDIOLOGY | Facility: CLINIC | Age: 75
End: 2025-01-03
Payer: MEDICARE

## 2025-01-03 VITALS
HEART RATE: 73 BPM | OXYGEN SATURATION: 97 % | BODY MASS INDEX: 35.98 KG/M2 | DIASTOLIC BLOOD PRESSURE: 88 MMHG | HEIGHT: 71 IN | SYSTOLIC BLOOD PRESSURE: 152 MMHG | WEIGHT: 257 LBS

## 2025-01-03 DIAGNOSIS — F10.20 UNCOMPLICATED ALCOHOL DEPENDENCE: ICD-10-CM

## 2025-01-03 DIAGNOSIS — I44.2 AV BLOCK, COMPLETE: Primary | Chronic | ICD-10-CM

## 2025-01-03 DIAGNOSIS — R06.09 DYSPNEA ON EXERTION: ICD-10-CM

## 2025-01-03 DIAGNOSIS — G47.33 OSA (OBSTRUCTIVE SLEEP APNEA): Chronic | ICD-10-CM

## 2025-01-03 DIAGNOSIS — Z95.0 PRESENCE OF CARDIAC PACEMAKER: Chronic | ICD-10-CM

## 2025-01-03 DIAGNOSIS — I48.0 PAROXYSMAL ATRIAL FIBRILLATION: ICD-10-CM

## 2025-01-03 DIAGNOSIS — I10 PRIMARY HYPERTENSION: Chronic | ICD-10-CM

## 2025-01-03 RX ORDER — MULTIVITAMIN
1 TABLET ORAL DAILY
COMMUNITY
Start: 2024-11-21

## 2025-01-03 RX ORDER — METHION/INOS/CHOL BT/B COM/LIV 110MG-86MG
1 CAPSULE ORAL DAILY
COMMUNITY
Start: 2024-11-21

## 2025-01-03 NOTE — PROGRESS NOTES
Cardiac Electrophysiology Outpatient Note  [facility] at [facility]    Office Visit     Tristin Cantu  [id number]  01/03/2025    Primary Care Physician: Jolene Amin MD    Referred By: No ref. provider found    Subjective     Chief Complaint   Patient presents with    AV block, complete       History of Present Illness:   Tristin Cantu is a 74 y.o. male who presents to my electrophysiology clinic for follow up of  2nd Degree AVB with PM check and recent diagnosis of atrial fibrillation via device transmission. His pacemaker was implanted in January 2023 after being admitted with presyncope and found to have 2nd degree AVB.  More recently, the patient did have a 5-1/2-hour episode of atrial fibrillation in late November.  This was his only atrial fibrillation occurrence.  He was started on Eliquis.  Since we last saw the patient approximately 8 months ago, he has not had a lot of changes from a cardiac standpoint.  He has continued dyspnea on exertion that is unchanged from previous.  He had a relatively normal echo in May 2024 with low normal LVEF 46-50%, grade 1 diastolic dysfunction and no significant VHD.  His CPAP was recalled and he has had trouble getting set back up with a new device.  He is scheduled for an appoint with sleep medicine coming up in February to address this.    Past Medical History:   Diagnosis Date    Abnormal ECG 2023    Cancer     Paroxysmal atrial fibrillation 1/3/2025       Past Surgical History:   Procedure Laterality Date    CARDIAC ELECTROPHYSIOLOGY PROCEDURE N/A 01/23/2023    Procedure: Pacemaker DC new;  Surgeon: Rick Villatoro MD;  Location: [facility] LOCATION;  Service: Cardiology;  Laterality: N/A;    CATARACT EXTRACTION      both eyes 2024    COLONOSCOPY  06/2023    INSERT / REPLACE / REMOVE PACEMAKER  2023    TOE SURGERY Left 12/2023    @ [facility]       Family History   Problem Relation Age of Onset    Cancer Mother  "    Cancer Father        Social History     Socioeconomic History    Marital status:    Tobacco Use    Smoking status: Never     Passive exposure: Never    Smokeless tobacco: Never   Vaping Use    Vaping status: Never Used   Substance and Sexual Activity    Alcohol use: Yes     Alcohol/week: 9.0 standard drinks of alcohol     Types: 9 Cans of beer per week     Comment: occas    Drug use: Never    Sexual activity: Not Currently     Partners: Female     Birth control/protection: None         Current Outpatient Medications:     apixaban (ELIQUIS) 5 MG tablet tablet, Take 1 tablet by mouth 2 (Two) Times a Day., Disp: 60 tablet, Rfl: 11    aspirin 325 MG tablet, Take 81 mg by mouth Daily. 0.5 tablet daily, Disp: , Rfl:     esomeprazole (nexIUM) 20 MG capsule, Take 1 capsule by mouth Every Morning Before Breakfast., Disp: , Rfl:     finasteride (Proscar) 5 MG tablet, Take 1 tablet by mouth Daily., Disp: 90 tablet, Rfl: 10    lisinopril (PRINIVIL,ZESTRIL) 40 MG tablet, Take 1 tablet by mouth Daily., Disp: , Rfl:     Multivitamin tablet tablet, Take 1 tablet by mouth Daily., Disp: , Rfl:     pravastatin (PRAVACHOL) 20 MG tablet, Take 1 tablet by mouth Daily., Disp: , Rfl:     sertraline (ZOLOFT) 50 MG tablet, Take 1 tablet by mouth Daily., Disp: , Rfl:     thiamine (VITAMIN B-1) 100 MG tablet tablet, Take 1 tablet by mouth Daily., Disp: , Rfl:     VITAMIN D PO, Take 1 tablet by mouth Daily., Disp: , Rfl:     Allergies:   Allergies   Allergen Reactions    Quinine Hives       Objective   Vital Signs: Blood pressure 152/88, pulse 73, height 180.3 cm (71\"), weight 117 kg (257 lb), SpO2 97%.    PHYSICAL EXAM  General appearance: Awake, alert, cooperative  Head: Normocephalic, without obvious abnormality, atraumatic  Lungs: Clear to ascultation bilaterally  Heart: Regular rate and rhythm, no murmurs, no lower extremity swelling  Skin: Skin color, turgor normal, no rashes or lesions  Neurologic: Grossly normal     Lab " "Results   Component Value Date    GLUCOSE 98 01/20/2023    CALCIUM 9.8 01/20/2023     01/20/2023    K 4.3 01/20/2023    CO2 26.0 01/20/2023     01/20/2023    BUN 16 01/20/2023    CREATININE 1.08 01/20/2023    BCR 14.8 01/20/2023    ANIONGAP 14.0 01/20/2023     Lab Results   Component Value Date    WBC 7.71 01/20/2023    HGB 14.4 01/20/2023    HCT 43.6 01/20/2023    MCV 94.8 01/20/2023     01/20/2023     No results found for: \"INR\", \"PROTIME\"  Lab Results   Component Value Date    TSH 2.230 01/20/2023          Results for orders placed during the hospital encounter of 05/09/24    Adult Transthoracic Echo Complete W/ Cont if Necessary Per Protocol    Interpretation Summary    Left ventricular ejection fraction appears to be 46 - 50%.    Left ventricular wall thickness is consistent with mild concentric hypertrophy.    Left ventricular diastolic function is consistent with (grade I) impaired relaxation.    The left atrial cavity is mild to moderately dilated.    Mildly technically limited study.    1.  LV size is normal global LV systolic function is in the low normal range with a visually estimated ejection fraction of 50±5%.  Mild concentric left ventricular pretreated with grade 1A diastolic dysfunction mild left atrial enlargement.  Right heart chambers are grossly normal.  Pacing or defibrillator leads are identified in the right heart.  No septal defect or other intracavitary mass or thrombus.    2.  The mitral valve is morphologically and physiologically normal.  The aortic valve is minimally sclerotic but is not calcified nor stenotic.  There is trivial AI.  Right-sided heart valves are unremarkable.    3.  No pericardial or great vessel pathology.    4.  Right heart pressures cannot be calculated.         I personally viewed and interpreted the patient's EKG/Telemetry/lab data    Procedures    Tristin Cantu  reports that he has never smoked. He has never been exposed to tobacco smoke. He " "has never used smokeless tobacco.        Advance Care Planning   Advance Care Planning: ACP discussion was declined by the patient. Patient does not have an advance directive, information provided.     Assessment & Plan    1. AV block, complete  S/p DC PPM    2. Presence of cardiac pacemaker  The patient's Saint Matt dual-chamber pacemaker was interrogated in the office today demonstrating normal device function with 8 years left on the battery, 6% atrial pacing, >99% RV pacing, acceptable threshold impedance values and a single 5-1/2-hour episode of atrial fibrillation.  No reprogramming or changes today.    3. Paroxysmal atrial fibrillation  Patient had a single 5 and half hour episode of atrial fibrillation in late November.  This is the only time he has had atrial fibrillation.  He was started on Eliquis.  He denies any palpitations or any awareness of atrial fibrillation although he is only had a single episode.  He is high risk for recurrence as he drinks 12 beers a day, is overweight and is currently not able to treat his sleep apnea.  Addressing these 3 reversible atrial fibrillation risk factors was discussed in detail today.    4. Dyspnea on exertion  Normal echocardiogram in May 2024.  I think this is likely due to untreated significant sleep apnea.  There is definitely some degree of deconditioning as well.  He is scheduled to establish with sleep medicine locally in February to try to get back on a CPAP.  His CPAP was previously recalled and has not been able to get back on 1.    5. Primary hypertension  BP elevated in the office today.  He reports his blood pressure at home was in the 130s systolic usually and he does check somewhat frequently.  I recommended he continue to log these blood pressures and call us if he has more blood pressures >100 30s systolic than not    7. Uncomplicated alcohol dependence  Patient reports he drinks 6-12 beers per day because he is \"bored\" and does not feel that he can " eliminate this completely but will try to dial back.  He does not drink any liquor.  Alcohol use's effect on atrial fibrillation was discussed in detail and cessation was encouraged.  Patient was educated that if he does end up quitting he would likely at the dose gradually anyway and watch for signs of withdrawal.  He might need to do this with the assistance of his PCP.       Follow Up:  Return in about 6 months (around 7/3/2025).      Thank you for allowing me to participate in the care of your patient. Please do not hesitate to contact me with additional questions or concerns.      Patel Stern PA-C  Cardiac Electrophysiology  [facility] / [facility]

## 2025-02-03 ENCOUNTER — TELEPHONE (OUTPATIENT)
Dept: CARDIOLOGY | Facility: CLINIC | Age: 75
End: 2025-02-03
Payer: MEDICARE

## 2025-02-03 DIAGNOSIS — I48.0 PAROXYSMAL ATRIAL FIBRILLATION: Primary | ICD-10-CM

## 2025-02-03 NOTE — TELEPHONE ENCOUNTER
Called to get a remote reading.  Mr Cantu isn't home.  Wife will have him send in a reading when he gets home.

## 2025-02-03 NOTE — TELEPHONE ENCOUNTER
Patient's spouse called and states patient has been experiencing increased SOB and generalized weakness. She states patient was seen in clinic on 1/3/2025 and his SOB has gotten worse. She state the patient can not do simple task without having to stop to rest.     She states the patient has not followed up with sleep medicine yet due to appointment availability.      She is unaware what his BP or HR is today.       She would like for patient to be seen in office. She feels further testing needs to be done.       Patient is followed remotely.         Please advise.

## 2025-02-12 ENCOUNTER — HOSPITAL ENCOUNTER (OUTPATIENT)
Dept: CARDIOLOGY | Facility: HOSPITAL | Age: 75
Discharge: HOME | End: 2025-02-12
Admitting: STUDENT IN AN ORGANIZED HEALTH CARE EDUCATION/TRAINING PROGRAM
Payer: MEDICARE

## 2025-02-12 VITALS — WEIGHT: 257 LBS | HEIGHT: 71 IN | BODY MASS INDEX: 35.98 KG/M2

## 2025-02-12 DIAGNOSIS — I48.0 PAROXYSMAL ATRIAL FIBRILLATION: ICD-10-CM

## 2025-02-12 PROCEDURE — 93306 TTE W/DOPPLER COMPLETE: CPT

## 2025-02-13 LAB
AV MEAN PRESS GRAD SYS DOP V1V2: 6 MMHG
AV VMAX SYS DOP: 167.5 CM/SEC
BH CV ECHO MEAS - AO MAX PG: 11.2 MMHG
BH CV ECHO MEAS - AO ROOT DIAM: 3.6 CM
BH CV ECHO MEAS - AO V2 VTI: 33.2 CM
BH CV ECHO MEAS - AVA(I,D): 2.7 CM2
BH CV ECHO MEAS - EDV(CUBED): 68.9 ML
BH CV ECHO MEAS - EDV(MOD-SP2): 74.2 ML
BH CV ECHO MEAS - EDV(MOD-SP4): 126 ML
BH CV ECHO MEAS - EF(MOD-SP2): 44.7 %
BH CV ECHO MEAS - EF(MOD-SP4): 67.1 %
BH CV ECHO MEAS - ESV(CUBED): 12.4 ML
BH CV ECHO MEAS - ESV(MOD-SP2): 41 ML
BH CV ECHO MEAS - ESV(MOD-SP4): 41.4 ML
BH CV ECHO MEAS - FS: 43.6 %
BH CV ECHO MEAS - IVS/LVPW: 1.15 CM
BH CV ECHO MEAS - IVSD: 1.5 CM
BH CV ECHO MEAS - LA DIMENSION: 4.1 CM
BH CV ECHO MEAS - LAT PEAK E' VEL: 8.2 CM/SEC
BH CV ECHO MEAS - LV DIASTOLIC VOL/BSA (35-75): 53.7 CM2
BH CV ECHO MEAS - LV MASS(C)D: 216.6 GRAMS
BH CV ECHO MEAS - LV MAX PG: 4.6 MMHG
BH CV ECHO MEAS - LV MEAN PG: 3 MMHG
BH CV ECHO MEAS - LV SYSTOLIC VOL/BSA (12-30): 17.6 CM2
BH CV ECHO MEAS - LV V1 MAX: 107 CM/SEC
BH CV ECHO MEAS - LV V1 VTI: 21.3 CM
BH CV ECHO MEAS - LVIDD: 4.1 CM
BH CV ECHO MEAS - LVIDS: 2.31 CM
BH CV ECHO MEAS - LVOT AREA: 4.2 CM2
BH CV ECHO MEAS - LVOT DIAM: 2.3 CM
BH CV ECHO MEAS - LVPWD: 1.3 CM
BH CV ECHO MEAS - MED PEAK E' VEL: 4.8 CM/SEC
BH CV ECHO MEAS - MV A MAX VEL: 116 CM/SEC
BH CV ECHO MEAS - MV DEC SLOPE: 690.3 CM/SEC2
BH CV ECHO MEAS - MV DEC TIME: 0.2 SEC
BH CV ECHO MEAS - MV E MAX VEL: 75.9 CM/SEC
BH CV ECHO MEAS - MV E/A: 0.65
BH CV ECHO MEAS - MV P1/2T: 46.6 MSEC
BH CV ECHO MEAS - MVA(P1/2T): 4.7 CM2
BH CV ECHO MEAS - PA ACC TIME: 0.13 SEC
BH CV ECHO MEAS - PA V2 MAX: 116.7 CM/SEC
BH CV ECHO MEAS - SV(LVOT): 88.4 ML
BH CV ECHO MEAS - SV(MOD-SP2): 33.2 ML
BH CV ECHO MEAS - SV(MOD-SP4): 84.6 ML
BH CV ECHO MEAS - SVI(LVOT): 37.7 ML/M2
BH CV ECHO MEAS - SVI(MOD-SP2): 14.2 ML/M2
BH CV ECHO MEAS - SVI(MOD-SP4): 36.1 ML/M2
BH CV ECHO MEAS - TAPSE (>1.6): 1.84 CM
BH CV ECHO MEASUREMENTS AVERAGE E/E' RATIO: 11.68
BH CV XLRA - RV BASE: 2.9 CM
BH CV XLRA - RV LENGTH: 6.6 CM
BH CV XLRA - RV MID: 2.8 CM
BH CV XLRA - TDI S': 13.4 CM/SEC
LEFT ATRIUM VOLUME INDEX: 13 ML/M2
LV EF 2D ECHO EST: 60 %
LV EF BIPLANE MOD: 59.4 %

## 2025-02-17 DIAGNOSIS — R06.09 DYSPNEA ON EXERTION: Primary | ICD-10-CM

## 2025-02-17 DIAGNOSIS — Z86.79 HISTORY OF COMPLETE AV BLOCK: ICD-10-CM

## 2025-02-24 ENCOUNTER — TELEMEDICINE (OUTPATIENT)
Dept: INTERNAL MEDICINE | Facility: CLINIC | Age: 75
End: 2025-02-24
Payer: MEDICARE

## 2025-02-24 VITALS — RESPIRATION RATE: 18 BRPM | DIASTOLIC BLOOD PRESSURE: 84 MMHG | HEART RATE: 90 BPM | SYSTOLIC BLOOD PRESSURE: 145 MMHG

## 2025-02-24 DIAGNOSIS — R07.9 CHEST PAIN, UNSPECIFIED TYPE: ICD-10-CM

## 2025-02-24 DIAGNOSIS — E78.2 MIXED HYPERLIPIDEMIA: ICD-10-CM

## 2025-02-24 DIAGNOSIS — I65.22 CAROTID ARTERY STENOSIS, SYMPTOMATIC, LEFT: Primary | ICD-10-CM

## 2025-02-24 DIAGNOSIS — R06.09 DYSPNEA ON EXERTION: ICD-10-CM

## 2025-02-24 DIAGNOSIS — I44.2 AV BLOCK, COMPLETE: ICD-10-CM

## 2025-02-24 DIAGNOSIS — I10 PRIMARY HYPERTENSION: ICD-10-CM

## 2025-02-24 PROBLEM — K57.90 DIVERTICULOSIS: Status: ACTIVE | Noted: 2025-02-24

## 2025-02-24 PROBLEM — D36.9 TUBULAR ADENOMA: Status: ACTIVE | Noted: 2025-02-24

## 2025-02-24 PROCEDURE — 3077F SYST BP >= 140 MM HG: CPT | Performed by: INTERNAL MEDICINE

## 2025-02-24 PROCEDURE — 3079F DIAST BP 80-89 MM HG: CPT | Performed by: INTERNAL MEDICINE

## 2025-02-24 PROCEDURE — 99203 OFFICE O/P NEW LOW 30 MIN: CPT | Performed by: INTERNAL MEDICINE

## 2025-02-24 NOTE — PROGRESS NOTES
Patient is a 74 y.o. male who is here for a follow up of   Chief Complaint   Patient presents with    Shortness of Breath    Hypertension    Hyperlipidemia       This was an audio and video enabled telemedicine encounter.    HPI:    Asked to see patient via televisit from his home in [place].  Patient c/o 2-3 week hx of increased SOB and WHITLOCK.  Walking up stairs and across him home makes him SOB.  His BP and pulse are also higher that usual.  No CP.  No cough or fever or chills.  No PND or orthopnea.  Fell about 6 weeks ago but no pain on inspiration.  Did not get CXR done after fall.  No melena or n/v.  No palpitations.    Compliant with meds.     History:     Patient Active Problem List   Diagnosis    Carotid artery stenosis, symptomatic, left    HTN (hypertension)    Mixed hyperlipidemia    JUDIT (obstructive sleep apnea)    Obese    AV block, complete    Presence of cardiac pacemaker    Dyspnea on exertion    Other fatigue    Paroxysmal atrial fibrillation    Uncomplicated alcohol dependence    Tubular adenoma    Diverticulosis       Past Medical History:   Diagnosis Date    Abnormal ECG 2023    Cancer     Paroxysmal atrial fibrillation 1/3/2025       Past Surgical History:   Procedure Laterality Date    CARDIAC ELECTROPHYSIOLOGY PROCEDURE N/A 01/23/2023    Procedure: Pacemaker DC new;  Surgeon: Rick Villatoro MD;  Location: [facility] INVASIVE LOCATION;  Service: Cardiology;  Laterality: N/A;    CATARACT EXTRACTION      both eyes 2024    COLONOSCOPY  06/2023    INSERT / REPLACE / REMOVE PACEMAKER  2023    TOE SURGERY Left 12/2023    @ [facility]       Current Outpatient Medications on File Prior to Visit   Medication Sig    apixaban (ELIQUIS) 5 MG tablet tablet Take 1 tablet by mouth 2 (Two) Times a Day.    aspirin 325 MG tablet Take 81 mg by mouth Daily. 0.5 tablet daily    esomeprazole (nexIUM) 20 MG capsule Take 1 capsule by mouth Every Morning Before Breakfast.    lisinopril (PRINIVIL,ZESTRIL) 40  MG tablet Take 1 tablet by mouth Daily.    Multivitamin tablet tablet Take 1 tablet by mouth Daily.    pravastatin (PRAVACHOL) 20 MG tablet Take 1 tablet by mouth Daily.    sertraline (ZOLOFT) 50 MG tablet Take 1 tablet by mouth Daily.    thiamine (VITAMIN B-1) 100 MG tablet tablet Take 1 tablet by mouth Daily.    VITAMIN D PO Take 1 tablet by mouth Daily.    finasteride (Proscar) 5 MG tablet Take 1 tablet by mouth Daily.     No current facility-administered medications on file prior to visit.       Family History   Problem Relation Age of Onset    Cancer Mother     Cancer Father        Social History     Socioeconomic History    Marital status:    Tobacco Use    Smoking status: Never     Passive exposure: Never    Smokeless tobacco: Never   Vaping Use    Vaping status: Never Used   Substance and Sexual Activity    Alcohol use: Yes     Alcohol/week: 9.0 standard drinks of alcohol     Types: 9 Cans of beer per week     Comment: occas    Drug use: Never    Sexual activity: Not Currently     Partners: Female     Birth control/protection: None         Review of Systems   Constitutional:  Positive for fatigue. Negative for chills, fever and unexpected weight change.   HENT:  Negative for congestion, ear pain, hearing loss, rhinorrhea, sinus pressure, sore throat and trouble swallowing.    Eyes:  Negative for discharge and itching.   Respiratory:  Positive for shortness of breath. Negative for cough and chest tightness.    Cardiovascular:  Negative for chest pain, palpitations and leg swelling.        Followed by [facility]   Gastrointestinal:  Negative for abdominal pain, blood in stool, constipation, diarrhea and vomiting.        6/23 colonoscopy with TA times one   Endocrine: Negative for polydipsia and polyuria.   Genitourinary:  Negative for difficulty urinating, dysuria, enuresis, frequency, hematuria and urgency.   Musculoskeletal:  Positive for arthralgias. Negative for back pain, gait problem and  joint swelling.   Skin:  Negative for rash and wound.   Allergic/Immunologic: Negative for immunocompromised state.   Neurological:  Negative for dizziness, syncope, weakness, light-headedness, numbness and headaches.   Hematological:  Does not bruise/bleed easily.   Psychiatric/Behavioral:  Negative for behavioral problems, dysphoric mood and sleep disturbance. The patient is not nervous/anxious.        /84   Pulse 90   Resp 18       Physical Exam  Constitutional:       General: He is not in acute distress.     Appearance: Normal appearance. He is obese. He is not ill-appearing or diaphoretic.   HENT:      Head: Normocephalic and atraumatic.   Pulmonary:      Effort: Pulmonary effort is normal. No respiratory distress.   Neurological:      General: No focal deficit present.      Mental Status: He is alert and oriented to person, place, and time. Mental status is at baseline.   Psychiatric:         Mood and Affect: Mood normal.         Behavior: Behavior normal.         Thought Content: Thought content normal.         Judgment: Judgment normal.         Procedure:      Historical Data:    SOB/WHITLOCK-will check labs and CXR, try to move up his Myoview, consider holter to see if HR becoming tachy  PAF-cont NOAC  HTN-cont ACE  PVD-cont RF mod  Hyperlipidemia-fasting labs soon  Diverticulosis-increase fiber, ie [employer]ru[employer]  Hx of TA-rescope in 6/2028  Hx of AV block-s/p PPM  GERD-cont PPI    Prior records and 2/12/25 ECHO noted     I spent 25 minutes in total including but not limited to the 20 minutes spent in direct conversation with this patient.               Current Outpatient Medications:     apixaban (ELIQUIS) 5 MG tablet tablet, Take 1 tablet by mouth 2 (Two) Times a Day., Disp: 60 tablet, Rfl: 11    aspirin 325 MG tablet, Take 81 mg by mouth Daily. 0.5 tablet daily, Disp: , Rfl:     esomeprazole (nexIUM) 20 MG capsule, Take 1 capsule by mouth Every Morning Before Breakfast., Disp: , Rfl:     lisinopril  (PRINIVIL,ZESTRIL) 40 MG tablet, Take 1 tablet by mouth Daily., Disp: , Rfl:     Multivitamin tablet tablet, Take 1 tablet by mouth Daily., Disp: , Rfl:     pravastatin (PRAVACHOL) 20 MG tablet, Take 1 tablet by mouth Daily., Disp: , Rfl:     sertraline (ZOLOFT) 50 MG tablet, Take 1 tablet by mouth Daily., Disp: , Rfl:     thiamine (VITAMIN B-1) 100 MG tablet tablet, Take 1 tablet by mouth Daily., Disp: , Rfl:     VITAMIN D PO, Take 1 tablet by mouth Daily., Disp: , Rfl:     finasteride (Proscar) 5 MG tablet, Take 1 tablet by mouth Daily., Disp: 90 tablet, Rfl: 10        Diagnoses and all orders for this visit:    1. Carotid artery stenosis, symptomatic, left (Primary)    2. Primary hypertension    3. Mixed hyperlipidemia    4. AV block, complete    5. Dyspnea on exertion

## 2025-02-25 DIAGNOSIS — R35.0 BENIGN PROSTATIC HYPERPLASIA WITH URINARY FREQUENCY: ICD-10-CM

## 2025-02-25 DIAGNOSIS — Z12.5 SCREENING FOR PROSTATE CANCER: ICD-10-CM

## 2025-02-25 DIAGNOSIS — E78.2 MIXED HYPERLIPIDEMIA: ICD-10-CM

## 2025-02-25 DIAGNOSIS — I10 PRIMARY HYPERTENSION: Primary | ICD-10-CM

## 2025-02-25 DIAGNOSIS — R06.02 SHORTNESS OF BREATH: ICD-10-CM

## 2025-02-25 DIAGNOSIS — N40.1 BENIGN PROSTATIC HYPERPLASIA WITH URINARY FREQUENCY: ICD-10-CM

## 2025-02-26 DIAGNOSIS — I44.2 AV BLOCK, COMPLETE: Primary | ICD-10-CM

## 2025-02-26 DIAGNOSIS — R06.09 DYSPNEA ON EXERTION: ICD-10-CM

## 2025-03-13 PROCEDURE — 93294 REM INTERROG EVL PM/LDLS PM: CPT | Performed by: STUDENT IN AN ORGANIZED HEALTH CARE EDUCATION/TRAINING PROGRAM

## 2025-03-13 PROCEDURE — 93296 REM INTERROG EVL PM/IDS: CPT | Performed by: STUDENT IN AN ORGANIZED HEALTH CARE EDUCATION/TRAINING PROGRAM

## 2025-03-14 ENCOUNTER — HOSPITAL ENCOUNTER (OUTPATIENT)
Dept: CARDIOLOGY | Facility: HOSPITAL | Age: 75
Discharge: HOME | End: 2025-03-14
Payer: MEDICARE

## 2025-03-14 VITALS — HEIGHT: 71 IN | BODY MASS INDEX: 35.98 KG/M2 | WEIGHT: 257 LBS

## 2025-03-14 DIAGNOSIS — Z86.79 HISTORY OF COMPLETE AV BLOCK: ICD-10-CM

## 2025-03-14 DIAGNOSIS — R06.09 DYSPNEA ON EXERTION: ICD-10-CM

## 2025-03-14 LAB
BH CV REST NUCLEAR ISOTOPE DOSE: 9.9 MCI
BH CV STRESS BP STAGE 2: NORMAL
BH CV STRESS BP STAGE 4: NORMAL
BH CV STRESS COMMENTS STAGE 1: NORMAL
BH CV STRESS DOSE REGADENOSON STAGE 1: 0.4
BH CV STRESS DURATION MIN STAGE 1: 1
BH CV STRESS DURATION MIN STAGE 2: 1
BH CV STRESS DURATION MIN STAGE 3: 1
BH CV STRESS DURATION MIN STAGE 4: 1
BH CV STRESS DURATION SEC STAGE 1: 10
BH CV STRESS DURATION SEC STAGE 2: 0
BH CV STRESS DURATION SEC STAGE 3: 0
BH CV STRESS DURATION SEC STAGE 4: 0
BH CV STRESS HR STAGE 1: 71
BH CV STRESS HR STAGE 2: 82
BH CV STRESS HR STAGE 3: 85
BH CV STRESS HR STAGE 4: 80
BH CV STRESS NUCLEAR ISOTOPE DOSE: 32.4 MCI
BH CV STRESS O2 STAGE 1: 97
BH CV STRESS O2 STAGE 2: 99
BH CV STRESS O2 STAGE 3: 100
BH CV STRESS O2 STAGE 4: 100
BH CV STRESS PROTOCOL 1: NORMAL
BH CV STRESS RECOVERY BP: NORMAL MMHG
BH CV STRESS RECOVERY HR: 76 BPM
BH CV STRESS RECOVERY O2: 98 %
BH CV STRESS STAGE 1: 1
BH CV STRESS STAGE 2: 2
BH CV STRESS STAGE 3: 3
BH CV STRESS STAGE 4: 4
MAXIMAL PREDICTED HEART RATE: 148 BPM
PERCENT MAX PREDICTED HR: 59.46 %
SPECT HRT GATED+EF W RNC IV: 68 %
STRESS BASELINE BP: NORMAL MMHG
STRESS BASELINE HR: 62 BPM
STRESS O2 SAT REST: 98 %
STRESS PERCENT HR: 70 %
STRESS POST ESTIMATED WORKLOAD: 1 METS
STRESS POST EXERCISE DUR MIN: 4 MIN
STRESS POST EXERCISE DUR SEC: 0 SEC
STRESS POST O2 SAT PEAK: 100 %
STRESS POST PEAK BP: NORMAL MMHG
STRESS POST PEAK HR: 88 BPM
STRESS TARGET HR: 126 BPM

## 2025-03-14 PROCEDURE — 78452 HT MUSCLE IMAGE SPECT MULT: CPT

## 2025-03-14 PROCEDURE — 93017 CV STRESS TEST TRACING ONLY: CPT

## 2025-03-14 PROCEDURE — 34310000005 TECHNETIUM SESTAMIBI

## 2025-03-14 PROCEDURE — 25010000002 REGADENOSON 0.4 MG/5ML SOLUTION

## 2025-03-14 PROCEDURE — A9500 TC99M SESTAMIBI: HCPCS

## 2025-03-14 RX ORDER — REGADENOSON 0.08 MG/ML
0.4 INJECTION, SOLUTION INTRAVENOUS ONCE
Status: COMPLETED | OUTPATIENT
Start: 2025-03-14 | End: 2025-03-14

## 2025-03-14 RX ADMIN — REGADENOSON 0.4 MG: 0.08 INJECTION, SOLUTION INTRAVENOUS at 10:07

## 2025-03-14 RX ADMIN — TECHNETIUM TC 99M SESTAMIBI 1 DOSE: 1 INJECTION INTRAVENOUS at 08:20

## 2025-03-14 RX ADMIN — TECHNETIUM TC 99M SESTAMIBI 1 DOSE: 1 INJECTION INTRAVENOUS at 10:10

## 2025-03-15 DIAGNOSIS — R94.39 ABNORMAL CARDIOVASCULAR STRESS TEST: Primary | ICD-10-CM

## 2025-03-15 RX ORDER — METOPROLOL TARTRATE 25 MG/1
12.5 TABLET, FILM COATED ORAL 2 TIMES DAILY
Qty: 60 TABLET | Refills: 2 | Status: SHIPPED | OUTPATIENT
Start: 2025-03-15

## 2025-03-15 RX ORDER — ROSUVASTATIN CALCIUM 20 MG/1
20 TABLET, COATED ORAL NIGHTLY
Qty: 30 TABLET | Refills: 5 | Status: SHIPPED | OUTPATIENT
Start: 2025-03-15

## 2025-03-15 RX ORDER — NITROGLYCERIN 0.4 MG/1
0.4 TABLET SUBLINGUAL
Qty: 25 TABLET | Refills: 12 | Status: SHIPPED | OUTPATIENT
Start: 2025-03-15

## 2025-03-16 PROBLEM — R94.39 ABNORMAL NUCLEAR STRESS TEST: Status: ACTIVE | Noted: 2025-03-16

## 2025-03-17 ENCOUNTER — TELEPHONE (OUTPATIENT)
Dept: CARDIOLOGY | Facility: CLINIC | Age: 75
End: 2025-03-17
Payer: MEDICARE

## 2025-03-17 PROBLEM — R07.9 CHEST PAIN: Status: ACTIVE | Noted: 2025-03-17

## 2025-03-17 NOTE — PROGRESS NOTES
"[facility]  Cardiology Consultation Note     Tristin Cantu  1950  Requesting Provider: Allen Navarrete MD  PCP: Jolene Amin MD    ID:  Tristin Cantu is a 74 y.o. male who resides in [place], [place].     REASON FOR CONSULTATION:    Abnormal nuclear stress  Dyspnea on exertion         Dear Dr. Navarrete:    Thank you for sending Tristin Cantu to me for evaluation of dyspnea on exertion abnormal nuclear stress test.  He is a 74-year-old gentleman with history of atrial fibrillation, permanent pacemaker (sees Rick Villatoro), and asymptomatic carotid artery disease.  The patient states that over the last year he has become progressively winded with exertion.  He states he cannot walk across his yard presently without becoming extremely winded.  He states that this is \"much worse\" than it was last summer.  He has had a couple pounds of weight gain but nothing significant since last year.  He denies chest pressure, jaw discomfort.    Tristin underwent echocardiogram in January which was unremarkable.  Nuclear stress test was performed several days ago and there was inferior lateral perfusion defect consistent with ischemia/infarct.  The patient has no known coronary artery disease and has never had a cardiac catheterization in the past.      Past Medical History, Past Surgical History, Family history, Social History, and Medications were all reviewed with the patient today and updated as necessary.       Current Outpatient Medications:     apixaban (ELIQUIS) 5 MG tablet tablet, Take 1 tablet by mouth 2 (Two) Times a Day., Disp: , Rfl:     esomeprazole (nexIUM) 20 MG capsule, Take 1 capsule by mouth Every Morning Before Breakfast., Disp: , Rfl:     fluticasone (FLONASE) 50 MCG/ACT nasal spray, Administer  into the nostril(s) as directed by provider Daily., Disp: , Rfl:     lisinopril (PRINIVIL,ZESTRIL) 40 MG tablet, Take 1 tablet by mouth Daily., Disp: , Rfl:     " metoprolol tartrate (LOPRESSOR) 25 MG tablet, Take 0.5 tablets by mouth 2 (Two) Times a Day., Disp: 60 tablet, Rfl: 2    Multivitamin tablet tablet, Take 1 tablet by mouth Daily., Disp: , Rfl:     nitroglycerin (Nitrostat) 0.4 MG SL tablet, Place 1 tablet under the tongue Every 5 (Five) Minutes As Needed for Chest Pain. Take no more than 3 doses in 15 minutes., Disp: 25 tablet, Rfl: 12    pravastatin (PRAVACHOL) 20 MG tablet, Take 1 tablet by mouth Every Night., Disp: , Rfl:     sertraline (ZOLOFT) 50 MG tablet, Take 1 tablet by mouth Daily., Disp: , Rfl:     thiamine (VITAMIN B-1) 100 MG tablet tablet, Take 1 tablet by mouth Daily., Disp: , Rfl:     VITAMIN D PO, Take 1 tablet by mouth Daily., Disp: , Rfl:     aspirin 81 MG EC tablet, Take 1 tablet by mouth Daily., Disp: 90 tablet, Rfl: 3    Allergies   Allergen Reactions    Rosuvastatin Myalgia    Quinine Hives         Past Medical History:   Diagnosis Date    Abnormal ECG 2023    Cancer     Paroxysmal atrial fibrillation 1/3/2025       Past Surgical History:   Procedure Laterality Date    CARDIAC ELECTROPHYSIOLOGY PROCEDURE N/A 01/23/2023    Procedure: Pacemaker DC new;  Surgeon: Rick Villatoro MD;  Location: [facility] INVASIVE LOCATION;  Service: Cardiology;  Laterality: N/A;    CATARACT EXTRACTION      both eyes 2024    COLONOSCOPY  06/2023    INSERT / REPLACE / REMOVE PACEMAKER  2023    TOE SURGERY Left 12/2023    @ [facility]       Family History   Problem Relation Age of Onset    Cancer Mother     Cancer Father        Social History     Tobacco Use    Smoking status: Never     Passive exposure: Never    Smokeless tobacco: Never   Substance Use Topics    Alcohol use: Yes     Alcohol/week: 9.0 standard drinks of alcohol     Types: 9 Cans of beer per week     Comment: occas       Review of Systems   Constitutional: Negative for malaise/fatigue.   Eyes:  Negative for vision loss in left eye and vision loss in right eye.   Cardiovascular:  Negative for  "dyspnea on exertion, orthopnea, palpitations, paroxysmal nocturnal dyspnea and syncope.   Respiratory:  Positive for shortness of breath.    Musculoskeletal:  Negative for myalgias.   Neurological:  Negative for brief paralysis, excessive daytime sleepiness, focal weakness, numbness, paresthesias and weakness.   All other systems reviewed and are negative.              /70 (BP Location: Left arm, Patient Position: Sitting, Cuff Size: Adult)   Pulse 72   Ht 180.3 cm (70.98\")   Wt 120 kg (263 lb 9.6 oz)   SpO2 97%   BMI 36.78 kg/m²        Constitutional:       Appearance: Healthy appearance. Obese.   Eyes:      General: No scleral icterus.  Neck:      Thyroid: No thyroid mass.      Vascular: No carotid bruit or JVD. JVD normal.   Pulmonary:      Effort: Pulmonary effort is normal.      Breath sounds: Normal breath sounds.   Cardiovascular:      Normal rate. Regular rhythm.      Murmurs: There is no murmur.      No gallop.    Edema:     Peripheral edema absent.   Skin:     General: Skin is warm. There is no cyanosis.   Neurological:      General: No focal deficit present.      Mental Status: Alert.   Psychiatric:         Attention and Perception: Attention normal.             ECG 12 Lead    Date/Time: 3/18/2025 9:39 AM  Performed by: Wellington Ogden IV, MD    Authorized by: Wellington Ogden IV, MD  Comparison: compared with previous ECG from 1/24/2023  Similar to previous ECG  Rhythm: sinus rhythm  BPM: 72  Conduction: left bundle branch block    Clinical impression: abnormal EKG          No labs available for review         Diagnoses and all orders for this visit:    1. Dyspnea on exertion (Primary)  Overview:  Echo (2/13/2025):  LVEF 60%. No significant valvular abnormalities.  Nuclear stress (3/14/2025): inferolateral ischemia/infarct.  LVEF 68%    Assessment & Plan:  Functional class III dyspnea, possible anginal equivalent  Echo shows structurally normal heart with no valvular " abnormality  Recommend cardiac catheterization    Orders:  -     Case Request Cath Lab: Left Heart Cath  -     proBNP; Future    2. Abnormal nuclear stress test  Overview:  Nuclear stress (3/14/2025): inferolateral ischemia/infarct.  LVEF 68%    Assessment & Plan:  Functional class III dyspnea  Recommend coronary angiogram with possible PCI via right radial approach    Orders:  -     CBC (No Diff); Future  -     nitroglycerin (Nitrostat) 0.4 MG SL tablet; Place 1 tablet under the tongue Every 5 (Five) Minutes As Needed for Chest Pain. Take no more than 3 doses in 15 minutes.  Dispense: 25 tablet; Refill: 12  -     Case Request Cath Lab: Left Heart Cath  -     proBNP; Future    3. Paroxysmal atrial fibrillation  Overview:  Echo (5/18/2024): LVEF 46 - 50%. Left atrial cavity is mild to moderately dilated.  Echo (2/13/2025):  LVEF 60%. No significant valvular abnormalities.  [id number] 3 (age, HTN, atherosclerosis)    Assessment & Plan:  Asymptomatic  Managed by Rick Villatoro  Hold apixaban 48 hours prior to procedure    Orders:  -     ECG 12 Lead  -     apixaban (ELIQUIS) 5 MG tablet tablet; Take 1 tablet by mouth 2 (Two) Times a Day.  -     CBC (No Diff); Future    4. Hyperlipidemia LDL goal <50  Overview:  High intensity statin therapy indicated due to carotid disease  Intolerant to rosuvastatin    Assessment & Plan:  Obtain lipids and LP(a) today  Based on results, will make recommendations regarding lipid therapy to target LDL <50    Orders:  -     LDL Cholesterol, Direct; Future  -     Comprehensive Metabolic Panel; Future  -     Lipid Panel; Future  -     Lipoprotein A (LPA); Future  -     pravastatin (PRAVACHOL) 20 MG tablet; Take 1 tablet by mouth Every Night.    5. Carotid artery stenosis, symptomatic, left  Overview:  CT angiogram (1/20/2023): Left ICA 40-50% stenosis    Assessment & Plan:  Asymptomatic  Continue aspirin and will reinstitute high intensity statin if possible    Orders:  -     aspirin 81 MG EC  tablet; Take 1 tablet by mouth Daily.  Dispense: 90 tablet; Refill: 3    6. Primary hypertension  Overview:  Target blood pressure <130/80 mmHg    Assessment & Plan:  Reasonably controlled  Continue metoprolol and lisinopril    Orders:  -     metoprolol tartrate (LOPRESSOR) 25 MG tablet; Take 0.5 tablets by mouth 2 (Two) Times a Day.  Dispense: 60 tablet; Refill: 2  -     lisinopril (PRINIVIL,ZESTRIL) 40 MG tablet; Take 1 tablet by mouth Daily.                 Cardiac catheterization via right radial approach.  Patient will need to hold apixaban 48 hours prior to procedure  Lab work today to include CMP, CBC, LP(a), direct LDL, proBNP          H. CAna Ogden MD, FACC, [facility]  Interventional Cardiology  03/18/25  09:51 EDT

## 2025-03-17 NOTE — H&P (VIEW-ONLY)
"[facility]  Cardiology Consultation Note     Tristin Cantu  1950  Requesting Provider: Allen Navarrete MD  PCP: Jolene Amin MD    ID:  Tristin Cantu is a 74 y.o. male who resides in [place], [place].     REASON FOR CONSULTATION:    Abnormal nuclear stress  Dyspnea on exertion         Dear Dr. Navarrete:    Thank you for sending Tristin Cantu to me for evaluation of dyspnea on exertion abnormal nuclear stress test.  He is a 74-year-old gentleman with history of atrial fibrillation, permanent pacemaker (sees Rick Villatoro), and asymptomatic carotid artery disease.  The patient states that over the last year he has become progressively winded with exertion.  He states he cannot walk across his yard presently without becoming extremely winded.  He states that this is \"much worse\" than it was last summer.  He has had a couple pounds of weight gain but nothing significant since last year.  He denies chest pressure, jaw discomfort.    Tristin underwent echocardiogram in January which was unremarkable.  Nuclear stress test was performed several days ago and there was inferior lateral perfusion defect consistent with ischemia/infarct.  The patient has no known coronary artery disease and has never had a cardiac catheterization in the past.      Past Medical History, Past Surgical History, Family history, Social History, and Medications were all reviewed with the patient today and updated as necessary.       Current Outpatient Medications:     apixaban (ELIQUIS) 5 MG tablet tablet, Take 1 tablet by mouth 2 (Two) Times a Day., Disp: , Rfl:     esomeprazole (nexIUM) 20 MG capsule, Take 1 capsule by mouth Every Morning Before Breakfast., Disp: , Rfl:     fluticasone (FLONASE) 50 MCG/ACT nasal spray, Administer  into the nostril(s) as directed by provider Daily., Disp: , Rfl:     lisinopril (PRINIVIL,ZESTRIL) 40 MG tablet, Take 1 tablet by mouth Daily., Disp: , Rfl:     " metoprolol tartrate (LOPRESSOR) 25 MG tablet, Take 0.5 tablets by mouth 2 (Two) Times a Day., Disp: 60 tablet, Rfl: 2    Multivitamin tablet tablet, Take 1 tablet by mouth Daily., Disp: , Rfl:     nitroglycerin (Nitrostat) 0.4 MG SL tablet, Place 1 tablet under the tongue Every 5 (Five) Minutes As Needed for Chest Pain. Take no more than 3 doses in 15 minutes., Disp: 25 tablet, Rfl: 12    pravastatin (PRAVACHOL) 20 MG tablet, Take 1 tablet by mouth Every Night., Disp: , Rfl:     sertraline (ZOLOFT) 50 MG tablet, Take 1 tablet by mouth Daily., Disp: , Rfl:     thiamine (VITAMIN B-1) 100 MG tablet tablet, Take 1 tablet by mouth Daily., Disp: , Rfl:     VITAMIN D PO, Take 1 tablet by mouth Daily., Disp: , Rfl:     aspirin 81 MG EC tablet, Take 1 tablet by mouth Daily., Disp: 90 tablet, Rfl: 3    Allergies   Allergen Reactions    Rosuvastatin Myalgia    Quinine Hives         Past Medical History:   Diagnosis Date    Abnormal ECG 2023    Cancer     Paroxysmal atrial fibrillation 1/3/2025       Past Surgical History:   Procedure Laterality Date    CARDIAC ELECTROPHYSIOLOGY PROCEDURE N/A 01/23/2023    Procedure: Pacemaker DC new;  Surgeon: Rick Villatoro MD;  Location: [facility] INVASIVE LOCATION;  Service: Cardiology;  Laterality: N/A;    CATARACT EXTRACTION      both eyes 2024    COLONOSCOPY  06/2023    INSERT / REPLACE / REMOVE PACEMAKER  2023    TOE SURGERY Left 12/2023    @ [place] [facility]       Family History   Problem Relation Age of Onset    Cancer Mother     Cancer Father        Social History     Tobacco Use    Smoking status: Never     Passive exposure: Never    Smokeless tobacco: Never   Substance Use Topics    Alcohol use: Yes     Alcohol/week: 9.0 standard drinks of alcohol     Types: 9 Cans of beer per week     Comment: occas       Review of Systems   Constitutional: Negative for malaise/fatigue.   Eyes:  Negative for vision loss in left eye and vision loss in right eye.   Cardiovascular:  Negative for  "dyspnea on exertion, orthopnea, palpitations, paroxysmal nocturnal dyspnea and syncope.   Respiratory:  Positive for shortness of breath.    Musculoskeletal:  Negative for myalgias.   Neurological:  Negative for brief paralysis, excessive daytime sleepiness, focal weakness, numbness, paresthesias and weakness.   All other systems reviewed and are negative.              /70 (BP Location: Left arm, Patient Position: Sitting, Cuff Size: Adult)   Pulse 72   Ht 180.3 cm (70.98\")   Wt 120 kg (263 lb 9.6 oz)   SpO2 97%   BMI 36.78 kg/m²        Constitutional:       Appearance: Healthy appearance. Obese.   Eyes:      General: No scleral icterus.  Neck:      Thyroid: No thyroid mass.      Vascular: No carotid bruit or JVD. JVD normal.   Pulmonary:      Effort: Pulmonary effort is normal.      Breath sounds: Normal breath sounds.   Cardiovascular:      Normal rate. Regular rhythm.      Murmurs: There is no murmur.      No gallop.    Edema:     Peripheral edema absent.   Skin:     General: Skin is warm. There is no cyanosis.   Neurological:      General: No focal deficit present.      Mental Status: Alert.   Psychiatric:         Attention and Perception: Attention normal.             ECG 12 Lead    Date/Time: 3/18/2025 9:39 AM  Performed by: Wellington Ogden IV, MD    Authorized by: Wellington Ogden IV, MD  Comparison: compared with previous ECG from 1/24/2023  Similar to previous ECG  Rhythm: sinus rhythm  BPM: 72  Conduction: left bundle branch block    Clinical impression: abnormal EKG          No labs available for review         Diagnoses and all orders for this visit:    1. Dyspnea on exertion (Primary)  Overview:  Echo (2/13/2025):  LVEF 60%. No significant valvular abnormalities.  Nuclear stress (3/14/2025): inferolateral ischemia/infarct.  LVEF 68%    Assessment & Plan:  Functional class III dyspnea, possible anginal equivalent  Echo shows structurally normal heart with no valvular " abnormality  Recommend cardiac catheterization    Orders:  -     Case Request Cath Lab: Left Heart Cath  -     proBNP; Future    2. Abnormal nuclear stress test  Overview:  Nuclear stress (3/14/2025): inferolateral ischemia/infarct.  LVEF 68%    Assessment & Plan:  Functional class III dyspnea  Recommend coronary angiogram with possible PCI via right radial approach    Orders:  -     CBC (No Diff); Future  -     nitroglycerin (Nitrostat) 0.4 MG SL tablet; Place 1 tablet under the tongue Every 5 (Five) Minutes As Needed for Chest Pain. Take no more than 3 doses in 15 minutes.  Dispense: 25 tablet; Refill: 12  -     Case Request Cath Lab: Left Heart Cath  -     proBNP; Future    3. Paroxysmal atrial fibrillation  Overview:  Echo (5/18/2024): LVEF 46 - 50%. Left atrial cavity is mild to moderately dilated.  Echo (2/13/2025):  LVEF 60%. No significant valvular abnormalities.  [id number] 3 (age, HTN, atherosclerosis)    Assessment & Plan:  Asymptomatic  Managed by Rick Villatoro  Hold apixaban 48 hours prior to procedure    Orders:  -     ECG 12 Lead  -     apixaban (ELIQUIS) 5 MG tablet tablet; Take 1 tablet by mouth 2 (Two) Times a Day.  -     CBC (No Diff); Future    4. Hyperlipidemia LDL goal <50  Overview:  High intensity statin therapy indicated due to carotid disease  Intolerant to rosuvastatin    Assessment & Plan:  Obtain lipids and LP(a) today  Based on results, will make recommendations regarding lipid therapy to target LDL <50    Orders:  -     LDL Cholesterol, Direct; Future  -     Comprehensive Metabolic Panel; Future  -     Lipid Panel; Future  -     Lipoprotein A (LPA); Future  -     pravastatin (PRAVACHOL) 20 MG tablet; Take 1 tablet by mouth Every Night.    5. Carotid artery stenosis, symptomatic, left  Overview:  CT angiogram (1/20/2023): Left ICA 40-50% stenosis    Assessment & Plan:  Asymptomatic  Continue aspirin and will reinstitute high intensity statin if possible    Orders:  -     aspirin 81 MG EC  tablet; Take 1 tablet by mouth Daily.  Dispense: 90 tablet; Refill: 3    6. Primary hypertension  Overview:  Target blood pressure <130/80 mmHg    Assessment & Plan:  Reasonably controlled  Continue metoprolol and lisinopril    Orders:  -     metoprolol tartrate (LOPRESSOR) 25 MG tablet; Take 0.5 tablets by mouth 2 (Two) Times a Day.  Dispense: 60 tablet; Refill: 2  -     lisinopril (PRINIVIL,ZESTRIL) 40 MG tablet; Take 1 tablet by mouth Daily.                 Cardiac catheterization via right radial approach.  Patient will need to hold apixaban 48 hours prior to procedure  Lab work today to include CMP, CBC, LP(a), direct LDL, proBNP          H. CAna Ogden MD, FACC, [facility]  Interventional Cardiology  03/18/25  09:51 EDT

## 2025-03-17 NOTE — TELEPHONE ENCOUNTER
Called patient to discuss abnormal stress test. He has already been set up for an appointment with interventional cardiologist Dr. Ogden tomorrow to discuss further.    Electronically signed by Patel Stern PA-C, 03/17/25, 2:18 PM EDT.

## 2025-03-18 ENCOUNTER — PREP FOR SURGERY (OUTPATIENT)
Dept: OTHER | Facility: HOSPITAL | Age: 75
End: 2025-03-18
Payer: MEDICARE

## 2025-03-18 ENCOUNTER — LAB (OUTPATIENT)
Dept: LAB | Facility: HOSPITAL | Age: 75
End: 2025-03-18
Payer: MEDICARE

## 2025-03-18 ENCOUNTER — OFFICE VISIT (OUTPATIENT)
Dept: CARDIOLOGY | Facility: CLINIC | Age: 75
End: 2025-03-18
Payer: MEDICARE

## 2025-03-18 VITALS
HEIGHT: 71 IN | DIASTOLIC BLOOD PRESSURE: 70 MMHG | WEIGHT: 263.6 LBS | HEART RATE: 72 BPM | OXYGEN SATURATION: 97 % | SYSTOLIC BLOOD PRESSURE: 132 MMHG | BODY MASS INDEX: 36.9 KG/M2

## 2025-03-18 DIAGNOSIS — E78.5 HYPERLIPIDEMIA LDL GOAL <50: ICD-10-CM

## 2025-03-18 DIAGNOSIS — R94.39 ABNORMAL STRESS TEST: Primary | ICD-10-CM

## 2025-03-18 DIAGNOSIS — I48.0 PAROXYSMAL ATRIAL FIBRILLATION: ICD-10-CM

## 2025-03-18 DIAGNOSIS — R94.39 ABNORMAL NUCLEAR STRESS TEST: ICD-10-CM

## 2025-03-18 DIAGNOSIS — R06.09 DYSPNEA ON EXERTION: Primary | ICD-10-CM

## 2025-03-18 DIAGNOSIS — I65.22 CAROTID ARTERY STENOSIS, SYMPTOMATIC, LEFT: ICD-10-CM

## 2025-03-18 DIAGNOSIS — I10 PRIMARY HYPERTENSION: ICD-10-CM

## 2025-03-18 DIAGNOSIS — R06.09 DYSPNEA ON EXERTION: ICD-10-CM

## 2025-03-18 PROBLEM — R07.9 CHEST PAIN: Status: RESOLVED | Noted: 2025-03-17 | Resolved: 2025-03-18

## 2025-03-18 PROBLEM — E66.812 CLASS 2 OBESITY DUE TO EXCESS CALORIES WITH BODY MASS INDEX (BMI) OF 35.0 TO 35.9 IN ADULT: Status: ACTIVE | Noted: 2023-01-20

## 2025-03-18 PROBLEM — R53.83 OTHER FATIGUE: Status: RESOLVED | Noted: 2024-04-19 | Resolved: 2025-03-18

## 2025-03-18 PROBLEM — E66.09 CLASS 2 OBESITY DUE TO EXCESS CALORIES WITH BODY MASS INDEX (BMI) OF 35.0 TO 35.9 IN ADULT: Status: ACTIVE | Noted: 2023-01-20

## 2025-03-18 LAB
ALBUMIN SERPL-MCNC: 4.2 G/DL (ref 3.5–5.2)
ALBUMIN/GLOB SERPL: 1.1 G/DL
ALP SERPL-CCNC: 131 U/L (ref 39–117)
ALT SERPL W P-5'-P-CCNC: 25 U/L (ref 1–41)
ANION GAP SERPL CALCULATED.3IONS-SCNC: 12.4 MMOL/L (ref 5–15)
AST SERPL-CCNC: 28 U/L (ref 1–40)
BILIRUB SERPL-MCNC: 0.5 MG/DL (ref 0–1.2)
BUN SERPL-MCNC: 13 MG/DL (ref 8–23)
BUN/CREAT SERPL: 12.9 (ref 7–25)
CALCIUM SPEC-SCNC: 9.4 MG/DL (ref 8.6–10.5)
CHLORIDE SERPL-SCNC: 97 MMOL/L (ref 98–107)
CHOLEST SERPL-MCNC: 159 MG/DL (ref 0–200)
CO2 SERPL-SCNC: 26.6 MMOL/L (ref 22–29)
CREAT SERPL-MCNC: 1.01 MG/DL (ref 0.76–1.27)
DEPRECATED RDW RBC AUTO: 47.1 FL (ref 37–54)
EGFRCR SERPLBLD CKD-EPI 2021: 79 ML/MIN/1.73
ERYTHROCYTE [DISTWIDTH] IN BLOOD BY AUTOMATED COUNT: 13.9 % (ref 12.3–15.4)
GLOBULIN UR ELPH-MCNC: 4 GM/DL
GLUCOSE SERPL-MCNC: 107 MG/DL (ref 65–99)
HCT VFR BLD AUTO: 43.6 % (ref 37.5–51)
HDLC SERPL-MCNC: 51 MG/DL (ref 40–60)
HGB BLD-MCNC: 13.6 G/DL (ref 13–17.7)
LDLC SERPL CALC-MCNC: 93 MG/DL (ref 0–100)
LDLC/HDLC SERPL: 1.81 {RATIO}
MCH RBC QN AUTO: 28.1 PG (ref 26.6–33)
MCHC RBC AUTO-ENTMCNC: 31.2 G/DL (ref 31.5–35.7)
MCV RBC AUTO: 90.1 FL (ref 79–97)
NT-PROBNP SERPL-MCNC: 270 PG/ML (ref 0–900)
PLATELET # BLD AUTO: 259 10*3/MM3 (ref 140–450)
PMV BLD AUTO: 9.5 FL (ref 6–12)
POTASSIUM SERPL-SCNC: 4.5 MMOL/L (ref 3.5–5.2)
PROT SERPL-MCNC: 8.2 G/DL (ref 6–8.5)
RBC # BLD AUTO: 4.84 10*6/MM3 (ref 4.14–5.8)
SODIUM SERPL-SCNC: 136 MMOL/L (ref 136–145)
TRIGL SERPL-MCNC: 79 MG/DL (ref 0–150)
VLDLC SERPL-MCNC: 15 MG/DL (ref 5–40)
WBC NRBC COR # BLD AUTO: 9.03 10*3/MM3 (ref 3.4–10.8)

## 2025-03-18 PROCEDURE — 36415 COLL VENOUS BLD VENIPUNCTURE: CPT

## 2025-03-18 PROCEDURE — 85027 COMPLETE CBC AUTOMATED: CPT

## 2025-03-18 PROCEDURE — 83880 ASSAY OF NATRIURETIC PEPTIDE: CPT

## 2025-03-18 PROCEDURE — 80053 COMPREHEN METABOLIC PANEL: CPT

## 2025-03-18 PROCEDURE — 80061 LIPID PANEL: CPT

## 2025-03-18 PROCEDURE — 83695 ASSAY OF LIPOPROTEIN(A): CPT

## 2025-03-18 RX ORDER — SODIUM CHLORIDE 0.9 % (FLUSH) 0.9 %
10 SYRINGE (ML) INJECTION EVERY 12 HOURS SCHEDULED
Status: CANCELLED | OUTPATIENT
Start: 2025-03-18

## 2025-03-18 RX ORDER — ACETAMINOPHEN 325 MG/1
650 TABLET ORAL EVERY 4 HOURS PRN
Status: CANCELLED | OUTPATIENT
Start: 2025-03-18

## 2025-03-18 RX ORDER — ASPIRIN 81 MG/1
81 TABLET ORAL DAILY
Qty: 90 TABLET | Refills: 3 | Status: SHIPPED | OUTPATIENT
Start: 2025-03-18

## 2025-03-18 RX ORDER — METOPROLOL TARTRATE 25 MG/1
12.5 TABLET, FILM COATED ORAL 2 TIMES DAILY
Qty: 60 TABLET | Refills: 2 | Status: SHIPPED | OUTPATIENT
Start: 2025-03-18

## 2025-03-18 RX ORDER — PRAVASTATIN SODIUM 20 MG
1 TABLET ORAL DAILY
COMMUNITY
End: 2025-03-18 | Stop reason: SDUPTHER

## 2025-03-18 RX ORDER — ASPIRIN 81 MG/1
81 TABLET ORAL DAILY
Status: CANCELLED | OUTPATIENT
Start: 2025-03-19

## 2025-03-18 RX ORDER — FLUTICASONE PROPIONATE 50 MCG
2 SPRAY, SUSPENSION (ML) NASAL DAILY
COMMUNITY

## 2025-03-18 RX ORDER — SODIUM CHLORIDE 9 MG/ML
40 INJECTION, SOLUTION INTRAVENOUS AS NEEDED
Status: CANCELLED | OUTPATIENT
Start: 2025-03-18

## 2025-03-18 RX ORDER — NITROGLYCERIN 0.4 MG/1
0.4 TABLET SUBLINGUAL
Status: CANCELLED | OUTPATIENT
Start: 2025-03-18

## 2025-03-18 RX ORDER — ASPIRIN 81 MG/1
324 TABLET, CHEWABLE ORAL ONCE
Status: CANCELLED | OUTPATIENT
Start: 2025-03-18 | End: 2025-03-18

## 2025-03-18 RX ORDER — SODIUM CHLORIDE 0.9 % (FLUSH) 0.9 %
10 SYRINGE (ML) INJECTION AS NEEDED
Status: CANCELLED | OUTPATIENT
Start: 2025-03-18

## 2025-03-18 RX ORDER — NITROGLYCERIN 0.4 MG/1
0.4 TABLET SUBLINGUAL
Qty: 25 TABLET | Refills: 12 | Status: SHIPPED | OUTPATIENT
Start: 2025-03-18

## 2025-03-18 RX ORDER — LISINOPRIL 40 MG/1
40 TABLET ORAL DAILY
Start: 2025-03-18

## 2025-03-18 RX ORDER — PRAVASTATIN SODIUM 20 MG
20 TABLET ORAL NIGHTLY
Status: ON HOLD
Start: 2025-03-18 | End: 2025-03-24 | Stop reason: SINTOL

## 2025-03-18 NOTE — ASSESSMENT & PLAN NOTE
Obtain lipids and LP(a) today  Based on results, will make recommendations regarding lipid therapy to target LDL <50

## 2025-03-18 NOTE — ASSESSMENT & PLAN NOTE
Functional class III dyspnea, possible anginal equivalent  Echo shows structurally normal heart with no valvular abnormality  Recommend cardiac catheterization

## 2025-03-18 NOTE — ASSESSMENT & PLAN NOTE
Functional class III dyspnea  Recommend coronary angiogram with possible PCI via right radial approach

## 2025-03-18 NOTE — LETTER
"March 18, 2025     Jolene Amin MD  [address]  [facility] [address]    Patient: Tristin Cantu   YOB: 1950   Date of Visit: 3/18/2025     Dear Jolene Amin MD:       Thank you for referring Tristin Cantu to me for evaluation. Below are the relevant portions of my assessment and plan of care.    If you have questions, please do not hesitate to call me. I look forward to following Tristin along with you.         Sincerely,        Wellington Ogden IV, MD        CC: No Recipients    Wellington Ogden IV, MD  03/18/25 0951  Signed  [facility] at [facility]  Cardiology Consultation Note     Tristin Cantu  1950  Requesting Provider: Allen Navarrete MD  PCP: Jolene Amin MD    ID:  Tristin Cantu is a 74 y.o. male who resides in [place], [place].     REASON FOR CONSULTATION:    Abnormal nuclear stress  Dyspnea on exertion         Dear Dr. Navarrete:    Thank you for sending Tristin Cantu to me for evaluation of dyspnea on exertion abnormal nuclear stress test.  He is a 74-year-old gentleman with history of atrial fibrillation, permanent pacemaker (sees Rick Villatoro), and asymptomatic carotid artery disease.  The patient states that over the last year he has become progressively winded with exertion.  He states he cannot walk across his yard presently without becoming extremely winded.  He states that this is \"much worse\" than it was last summer.  He has had a couple pounds of weight gain but nothing significant since last year.  He denies chest pressure, jaw discomfort.    Tristin underwent echocardiogram in January which was unremarkable.  Nuclear stress test was performed several days ago and there was inferior lateral perfusion defect consistent with ischemia/infarct.  The patient has no known coronary artery disease and has never had a cardiac catheterization in the past.      Past Medical History, Past Surgical " History, Family history, Social History, and Medications were all reviewed with the patient today and updated as necessary.       Current Outpatient Medications:   •  apixaban (ELIQUIS) 5 MG tablet tablet, Take 1 tablet by mouth 2 (Two) Times a Day., Disp: , Rfl:   •  esomeprazole (nexIUM) 20 MG capsule, Take 1 capsule by mouth Every Morning Before Breakfast., Disp: , Rfl:   •  fluticasone (FLONASE) 50 MCG/ACT nasal spray, Administer  into the nostril(s) as directed by provider Daily., Disp: , Rfl:   •  lisinopril (PRINIVIL,ZESTRIL) 40 MG tablet, Take 1 tablet by mouth Daily., Disp: , Rfl:   •  metoprolol tartrate (LOPRESSOR) 25 MG tablet, Take 0.5 tablets by mouth 2 (Two) Times a Day., Disp: 60 tablet, Rfl: 2  •  Multivitamin tablet tablet, Take 1 tablet by mouth Daily., Disp: , Rfl:   •  nitroglycerin (Nitrostat) 0.4 MG SL tablet, Place 1 tablet under the tongue Every 5 (Five) Minutes As Needed for Chest Pain. Take no more than 3 doses in 15 minutes., Disp: 25 tablet, Rfl: 12  •  pravastatin (PRAVACHOL) 20 MG tablet, Take 1 tablet by mouth Every Night., Disp: , Rfl:   •  sertraline (ZOLOFT) 50 MG tablet, Take 1 tablet by mouth Daily., Disp: , Rfl:   •  thiamine (VITAMIN B-1) 100 MG tablet tablet, Take 1 tablet by mouth Daily., Disp: , Rfl:   •  VITAMIN D PO, Take 1 tablet by mouth Daily., Disp: , Rfl:   •  aspirin 81 MG EC tablet, Take 1 tablet by mouth Daily., Disp: 90 tablet, Rfl: 3    Allergies   Allergen Reactions   • Rosuvastatin Myalgia   • Quinine Hives         Past Medical History:   Diagnosis Date   • Abnormal ECG 2023   • Cancer    • Paroxysmal atrial fibrillation 1/3/2025       Past Surgical History:   Procedure Laterality Date   • CARDIAC ELECTROPHYSIOLOGY PROCEDURE N/A 01/23/2023    Procedure: Pacemaker DC new;  Surgeon: Rick Villatoro MD;  Location: [facility] INVASIVE LOCATION;  Service: Cardiology;  Laterality: N/A;   • CATARACT EXTRACTION      both eyes 2024   • COLONOSCOPY  06/2023   • INSERT /  "REPLACE / REMOVE PACEMAKER  2023   • TOE SURGERY Left 12/2023    @ [facility]       Family History   Problem Relation Age of Onset   • Cancer Mother    • Cancer Father        Social History     Tobacco Use   • Smoking status: Never     Passive exposure: Never   • Smokeless tobacco: Never   Substance Use Topics   • Alcohol use: Yes     Alcohol/week: 9.0 standard drinks of alcohol     Types: 9 Cans of beer per week     Comment: occas       Review of Systems   Constitutional: Negative for malaise/fatigue.   Eyes:  Negative for vision loss in left eye and vision loss in right eye.   Cardiovascular:  Negative for dyspnea on exertion, orthopnea, palpitations, paroxysmal nocturnal dyspnea and syncope.   Respiratory:  Positive for shortness of breath.    Musculoskeletal:  Negative for myalgias.   Neurological:  Negative for brief paralysis, excessive daytime sleepiness, focal weakness, numbness, paresthesias and weakness.   All other systems reviewed and are negative.              /70 (BP Location: Left arm, Patient Position: Sitting, Cuff Size: Adult)   Pulse 72   Ht 180.3 cm (70.98\")   Wt 120 kg (263 lb 9.6 oz)   SpO2 97%   BMI 36.78 kg/m²        Constitutional:       Appearance: Healthy appearance. Obese.   Eyes:      General: No scleral icterus.  Neck:      Thyroid: No thyroid mass.      Vascular: No carotid bruit or JVD. JVD normal.   Pulmonary:      Effort: Pulmonary effort is normal.      Breath sounds: Normal breath sounds.   Cardiovascular:      Normal rate. Regular rhythm.      Murmurs: There is no murmur.      No gallop.    Edema:     Peripheral edema absent.   Skin:     General: Skin is warm. There is no cyanosis.   Neurological:      General: No focal deficit present.      Mental Status: Alert.   Psychiatric:         Attention and Perception: Attention normal.             ECG 12 Lead    Date/Time: 3/18/2025 9:39 AM  Performed by: Wellington Ogden IV, MD    Authorized by: Alin, " Wellington WHALEY IV, MD  Comparison: compared with previous ECG from 1/24/2023  Similar to previous ECG  Rhythm: sinus rhythm  BPM: 72  Conduction: left bundle branch block    Clinical impression: abnormal EKG          No labs available for review         Diagnoses and all orders for this visit:    1. Dyspnea on exertion (Primary)  Overview:  Echo (2/13/2025):  LVEF 60%. No significant valvular abnormalities.  Nuclear stress (3/14/2025): inferolateral ischemia/infarct.  LVEF 68%    Assessment & Plan:  Functional class III dyspnea, possible anginal equivalent  Echo shows structurally normal heart with no valvular abnormality  Recommend cardiac catheterization    Orders:  -     Case Request Cath Lab: Left Heart Cath  -     proBNP; Future    2. Abnormal nuclear stress test  Overview:  Nuclear stress (3/14/2025): inferolateral ischemia/infarct.  LVEF 68%    Assessment & Plan:  Functional class III dyspnea  Recommend coronary angiogram with possible PCI via right radial approach    Orders:  -     CBC (No Diff); Future  -     nitroglycerin (Nitrostat) 0.4 MG SL tablet; Place 1 tablet under the tongue Every 5 (Five) Minutes As Needed for Chest Pain. Take no more than 3 doses in 15 minutes.  Dispense: 25 tablet; Refill: 12  -     Case Request Cath Lab: Left Heart Cath  -     proBNP; Future    3. Paroxysmal atrial fibrillation  Overview:  Echo (5/18/2024): LVEF 46 - 50%. Left atrial cavity is mild to moderately dilated.  Echo (2/13/2025):  LVEF 60%. No significant valvular abnormalities.  [id number] 3 (age, HTN, atherosclerosis)    Assessment & Plan:  Asymptomatic  Managed by Rick Villatoro  Hold apixaban 48 hours prior to procedure    Orders:  -     ECG 12 Lead  -     apixaban (ELIQUIS) 5 MG tablet tablet; Take 1 tablet by mouth 2 (Two) Times a Day.  -     CBC (No Diff); Future    4. Hyperlipidemia LDL goal <50  Overview:  High intensity statin therapy indicated due to carotid disease  Intolerant to rosuvastatin    Assessment  & Plan:  Obtain lipids and LP(a) today  Based on results, will make recommendations regarding lipid therapy to target LDL <50    Orders:  -     LDL Cholesterol, Direct; Future  -     Comprehensive Metabolic Panel; Future  -     Lipid Panel; Future  -     Lipoprotein A (LPA); Future  -     pravastatin (PRAVACHOL) 20 MG tablet; Take 1 tablet by mouth Every Night.    5. Carotid artery stenosis, symptomatic, left  Overview:  CT angiogram (1/20/2023): Left ICA 40-50% stenosis    Assessment & Plan:  Asymptomatic  Continue aspirin and will reinstitute high intensity statin if possible    Orders:  -     aspirin 81 MG EC tablet; Take 1 tablet by mouth Daily.  Dispense: 90 tablet; Refill: 3    6. Primary hypertension  Overview:  Target blood pressure <130/80 mmHg    Assessment & Plan:  Reasonably controlled  Continue metoprolol and lisinopril    Orders:  -     metoprolol tartrate (LOPRESSOR) 25 MG tablet; Take 0.5 tablets by mouth 2 (Two) Times a Day.  Dispense: 60 tablet; Refill: 2  -     lisinopril (PRINIVIL,ZESTRIL) 40 MG tablet; Take 1 tablet by mouth Daily.                 Cardiac catheterization via right radial approach.  Patient will need to hold apixaban 48 hours prior to procedure  Lab work today to include CMP, CBC, LP(a), direct LDL, proBNP          H. CAna Ogden MD, FACC, [facility]  Interventional Cardiology  03/18/25  09:51 EDT

## 2025-03-20 LAB
CV ZIO BASELINE AVG BPM: 72
CV ZIO BASELINE BPM HIGH: 122
CV ZIO BASELINE BPM LOW: 60
LPA SERPL-SCNC: 22 NMOL/L

## 2025-03-24 ENCOUNTER — HOSPITAL ENCOUNTER (OUTPATIENT)
Facility: HOSPITAL | Age: 75
Setting detail: HOSPITAL OUTPATIENT SURGERY
Discharge: HOME | End: 2025-03-24
Attending: INTERNAL MEDICINE | Admitting: INTERNAL MEDICINE
Payer: MEDICARE

## 2025-03-24 VITALS
HEIGHT: 70 IN | RESPIRATION RATE: 16 BRPM | WEIGHT: 256.84 LBS | HEART RATE: 60 BPM | DIASTOLIC BLOOD PRESSURE: 91 MMHG | BODY MASS INDEX: 36.77 KG/M2 | SYSTOLIC BLOOD PRESSURE: 115 MMHG | OXYGEN SATURATION: 95 % | TEMPERATURE: 96.8 F

## 2025-03-24 DIAGNOSIS — R94.39 ABNORMAL STRESS TEST: ICD-10-CM

## 2025-03-24 DIAGNOSIS — R06.02 SHORTNESS OF BREATH: Primary | ICD-10-CM

## 2025-03-24 DIAGNOSIS — R94.39 ABNORMAL NUCLEAR STRESS TEST: ICD-10-CM

## 2025-03-24 DIAGNOSIS — R06.09 DYSPNEA ON EXERTION: ICD-10-CM

## 2025-03-24 DIAGNOSIS — I48.0 PAROXYSMAL ATRIAL FIBRILLATION: ICD-10-CM

## 2025-03-24 PROBLEM — I25.10 CORONARY ARTERY DISEASE INVOLVING NATIVE CORONARY ARTERY OF NATIVE HEART WITHOUT ANGINA PECTORIS: Status: ACTIVE | Noted: 2025-03-24

## 2025-03-24 LAB — HBA1C MFR BLD: 5.5 % (ref 4.8–5.6)

## 2025-03-24 PROCEDURE — 93458 L HRT ARTERY/VENTRICLE ANGIO: CPT | Performed by: INTERNAL MEDICINE

## 2025-03-24 PROCEDURE — 25010000002 FENTANYL CITRATE (PF) 50 MCG/ML SOLUTION: Performed by: INTERNAL MEDICINE

## 2025-03-24 PROCEDURE — 25510000001 IOPAMIDOL PER 1 ML: Performed by: INTERNAL MEDICINE

## 2025-03-24 PROCEDURE — 83036 HEMOGLOBIN GLYCOSYLATED A1C: CPT | Performed by: NURSE PRACTITIONER

## 2025-03-24 PROCEDURE — C1894 INTRO/SHEATH, NON-LASER: HCPCS | Performed by: INTERNAL MEDICINE

## 2025-03-24 PROCEDURE — 25810000003 SODIUM CHLORIDE 0.9 % SOLUTION: Performed by: NURSE PRACTITIONER

## 2025-03-24 PROCEDURE — 25810000003 SODIUM CHLORIDE 0.9 % SOLUTION: Performed by: INTERNAL MEDICINE

## 2025-03-24 PROCEDURE — 25010000002 LIDOCAINE PF 1% 1 % SOLUTION: Performed by: INTERNAL MEDICINE

## 2025-03-24 PROCEDURE — C1769 GUIDE WIRE: HCPCS | Performed by: INTERNAL MEDICINE

## 2025-03-24 PROCEDURE — 25010000002 HEPARIN (PORCINE) PER 1000 UNITS: Performed by: INTERNAL MEDICINE

## 2025-03-24 PROCEDURE — 25010000002 MIDAZOLAM PER 1 MG: Performed by: INTERNAL MEDICINE

## 2025-03-24 PROCEDURE — 25010000002 NICARDIPINE 2.5 MG/ML SOLUTION: Performed by: INTERNAL MEDICINE

## 2025-03-24 PROCEDURE — C1887 CATHETER, GUIDING: HCPCS | Performed by: INTERNAL MEDICINE

## 2025-03-24 RX ORDER — ACETAMINOPHEN 325 MG/1
650 TABLET ORAL EVERY 4 HOURS PRN
Status: DISCONTINUED | OUTPATIENT
Start: 2025-03-24 | End: 2025-03-24 | Stop reason: HOSPADM

## 2025-03-24 RX ORDER — SODIUM CHLORIDE 0.9 % (FLUSH) 0.9 %
10 SYRINGE (ML) INJECTION AS NEEDED
Status: DISCONTINUED | OUTPATIENT
Start: 2025-03-24 | End: 2025-03-24 | Stop reason: HOSPADM

## 2025-03-24 RX ORDER — ASPIRIN 81 MG/1
324 TABLET, CHEWABLE ORAL ONCE
Status: COMPLETED | OUTPATIENT
Start: 2025-03-24 | End: 2025-03-24

## 2025-03-24 RX ORDER — MIDAZOLAM HYDROCHLORIDE 1 MG/ML
INJECTION, SOLUTION INTRAMUSCULAR; INTRAVENOUS
Status: DISCONTINUED | OUTPATIENT
Start: 2025-03-24 | End: 2025-03-24 | Stop reason: HOSPADM

## 2025-03-24 RX ORDER — NITROGLYCERIN 0.4 MG/1
0.4 TABLET SUBLINGUAL
Status: DISCONTINUED | OUTPATIENT
Start: 2025-03-24 | End: 2025-03-24 | Stop reason: HOSPADM

## 2025-03-24 RX ORDER — IOPAMIDOL 755 MG/ML
INJECTION, SOLUTION INTRAVASCULAR
Status: DISCONTINUED | OUTPATIENT
Start: 2025-03-24 | End: 2025-03-24 | Stop reason: HOSPADM

## 2025-03-24 RX ORDER — ROSUVASTATIN CALCIUM 20 MG/1
20 TABLET, COATED ORAL DAILY
COMMUNITY

## 2025-03-24 RX ORDER — ASPIRIN 81 MG/1
81 TABLET ORAL DAILY
Status: DISCONTINUED | OUTPATIENT
Start: 2025-03-25 | End: 2025-03-24 | Stop reason: HOSPADM

## 2025-03-24 RX ORDER — SODIUM CHLORIDE 0.9 % (FLUSH) 0.9 %
10 SYRINGE (ML) INJECTION EVERY 12 HOURS SCHEDULED
Status: DISCONTINUED | OUTPATIENT
Start: 2025-03-24 | End: 2025-03-24 | Stop reason: HOSPADM

## 2025-03-24 RX ORDER — HEPARIN SODIUM 1000 [USP'U]/ML
INJECTION, SOLUTION INTRAVENOUS; SUBCUTANEOUS
Status: DISCONTINUED | OUTPATIENT
Start: 2025-03-24 | End: 2025-03-24 | Stop reason: HOSPADM

## 2025-03-24 RX ORDER — ISOSORBIDE MONONITRATE 30 MG/1
30 TABLET, EXTENDED RELEASE ORAL EVERY MORNING
Qty: 30 TABLET | Refills: 0 | Status: SHIPPED | OUTPATIENT
Start: 2025-03-24

## 2025-03-24 RX ORDER — LIDOCAINE HYDROCHLORIDE 10 MG/ML
INJECTION, SOLUTION EPIDURAL; INFILTRATION; INTRACAUDAL; PERINEURAL
Status: DISCONTINUED | OUTPATIENT
Start: 2025-03-24 | End: 2025-03-24 | Stop reason: HOSPADM

## 2025-03-24 RX ORDER — FENTANYL CITRATE 50 UG/ML
INJECTION, SOLUTION INTRAMUSCULAR; INTRAVENOUS
Status: DISCONTINUED | OUTPATIENT
Start: 2025-03-24 | End: 2025-03-24 | Stop reason: HOSPADM

## 2025-03-24 RX ORDER — CHLORAL HYDRATE 500 MG
1000 CAPSULE ORAL
COMMUNITY

## 2025-03-24 RX ORDER — SODIUM CHLORIDE 9 MG/ML
40 INJECTION, SOLUTION INTRAVENOUS AS NEEDED
Status: DISCONTINUED | OUTPATIENT
Start: 2025-03-24 | End: 2025-03-24 | Stop reason: HOSPADM

## 2025-03-24 RX ADMIN — ASPIRIN 81 MG 324 MG: 81 TABLET ORAL at 13:07

## 2025-03-24 RX ADMIN — SODIUM CHLORIDE 330 ML: 0.9 INJECTION, SOLUTION INTRAVENOUS at 13:08

## 2025-03-24 NOTE — Clinical Note
Hemostasis started on the right radial artery. R-Band was used in achieving hemostasis. Radial compression device applied to vessel. Hemostasis achieved successfully. Closure device additional comment: 12 Cc of air in the band

## 2025-03-24 NOTE — INTERVAL H&P NOTE
H&P reviewed. The patient was examined and there are no changes to the H&P.    Plan: LHC +/- CBI    Access: Right radial artery, positive Barbeau test.  Previously assessed by Dr. Ogden on 3/18    The risks, benefits, and alternatives of the procedure have been reviewed and the patient wishes to proceed.     MAR Cabral

## 2025-03-25 DIAGNOSIS — R06.02 SOB (SHORTNESS OF BREATH): Primary | ICD-10-CM

## 2025-03-27 DIAGNOSIS — R06.02 SOB (SHORTNESS OF BREATH): Primary | ICD-10-CM

## 2025-03-27 DIAGNOSIS — R06.09 DYSPNEA ON EXERTION: ICD-10-CM

## 2025-03-29 ENCOUNTER — HOSPITAL ENCOUNTER (OUTPATIENT)
Facility: HOSPITAL | Age: 75
Discharge: HOME | End: 2025-03-29
Payer: MEDICARE

## 2025-03-29 PROCEDURE — 25510000001 IOPAMIDOL 61 % SOLUTION: Performed by: INTERNAL MEDICINE

## 2025-03-29 PROCEDURE — 71260 CT THORAX DX C+: CPT

## 2025-03-29 RX ORDER — IOPAMIDOL 612 MG/ML
100 INJECTION, SOLUTION INTRAVASCULAR
Status: COMPLETED | OUTPATIENT
Start: 2025-03-29 | End: 2025-03-29

## 2025-03-29 RX ADMIN — IOPAMIDOL 85 ML: 612 INJECTION, SOLUTION INTRAVENOUS at 16:32

## 2025-03-30 DIAGNOSIS — N28.89 RENAL MASS: Primary | ICD-10-CM

## 2025-03-31 ENCOUNTER — HOSPITAL ENCOUNTER (OUTPATIENT)
Facility: HOSPITAL | Age: 75
Discharge: HOME | End: 2025-03-31
Admitting: INTERNAL MEDICINE
Payer: MEDICARE

## 2025-03-31 DIAGNOSIS — N28.89 RIGHT RENAL MASS: Primary | ICD-10-CM

## 2025-03-31 LAB — CREAT BLDA-MCNC: 1.1 MG/DL (ref 0.6–1.3)

## 2025-03-31 PROCEDURE — 82565 ASSAY OF CREATININE: CPT

## 2025-03-31 PROCEDURE — 25510000001 IOPAMIDOL PER 1 ML: Performed by: INTERNAL MEDICINE

## 2025-03-31 PROCEDURE — 74178 CT ABD&PLV WO CNTR FLWD CNTR: CPT

## 2025-03-31 RX ORDER — IOPAMIDOL 755 MG/ML
100 INJECTION, SOLUTION INTRAVASCULAR
Status: COMPLETED | OUTPATIENT
Start: 2025-03-31 | End: 2025-03-31

## 2025-03-31 RX ADMIN — IOPAMIDOL 85 ML: 755 INJECTION, SOLUTION INTRAVENOUS at 14:15

## 2025-04-04 ENCOUNTER — READMISSION MANAGEMENT (OUTPATIENT)
Dept: CALL CENTER | Facility: HOSPITAL | Age: 75
End: 2025-04-04
Payer: MEDICARE

## 2025-04-05 NOTE — OUTREACH NOTE
Prep Survey      Flowsheet Row Responses   [facility] facility patient discharged from? Non-BH   Is LACE score < 7 ? Non-BH Discharge   Eligibility [facility]   Date of Admission 04/01/25   Date of Discharge 04/04/25   Discharge Disposition Home or Self Care   Discharge diagnosis Shortness of breath   Does the patient have one of the following disease processes/diagnoses(primary or secondary)? Other   Does the patient have Home health ordered? No   Prep survey completed? Yes            LIZ ANDERSON - Registered Nurse

## 2025-04-07 ENCOUNTER — TRANSITIONAL CARE MANAGEMENT TELEPHONE ENCOUNTER (OUTPATIENT)
Dept: CALL CENTER | Facility: HOSPITAL | Age: 75
End: 2025-04-07
Payer: MEDICARE

## 2025-04-07 NOTE — OUTREACH NOTE
Call Center TCM Note      Flowsheet Row Responses   [facility] patient discharged from? Non-  []   Does the patient have one of the following disease processes/diagnoses(primary or secondary)? Other   TCM attempt successful? Yes   Call start time 1209   Call end time 1212   Discharge diagnosis Shortness of breath,  right renal mass   Is patient permission given to speak with other caregiver? Yes   List who call center can speak with spouse- Rosa   Person spoke with today (if not patient) and relationship spouse- Rosa   Does the patient have all medications ordered at discharge? Yes   Is the patient taking all medications as directed (includes completed medication regime)? Yes   Does the patient have an appointment with their PCP within 7-14 days of discharge? No   Nursing Interventions Patient declined scheduling/rescheduling appointment at this time   Has home health visited the patient within 72 hours of discharge? N/A   Psychosocial issues? No   TCM call completed? Yes   Wrap up additional comments Per spouse, patient is doing well, denies any questions or concerns, states she will talk to PCP office about patient's follow up appt.   Call end time 1212   Would this patient benefit from a Referral to Amb Social Work? No   Is the patient interested in additional calls from an ambulatory ? No            Celeste Dhaliwal Registered Nurse    4/7/2025, 12:12 EDT

## 2025-04-10 ENCOUNTER — OFFICE VISIT (OUTPATIENT)
Dept: CARDIOLOGY | Facility: CLINIC | Age: 75
End: 2025-04-10
Payer: MEDICARE

## 2025-04-10 VITALS
SYSTOLIC BLOOD PRESSURE: 148 MMHG | HEIGHT: 70 IN | WEIGHT: 259.3 LBS | BODY MASS INDEX: 37.12 KG/M2 | HEART RATE: 69 BPM | OXYGEN SATURATION: 98 % | DIASTOLIC BLOOD PRESSURE: 84 MMHG

## 2025-04-10 DIAGNOSIS — I10 PRIMARY HYPERTENSION: ICD-10-CM

## 2025-04-10 DIAGNOSIS — I48.0 PAROXYSMAL ATRIAL FIBRILLATION: ICD-10-CM

## 2025-04-10 DIAGNOSIS — Z95.0 PRESENCE OF CARDIAC PACEMAKER: ICD-10-CM

## 2025-04-10 DIAGNOSIS — I44.2 AV BLOCK, COMPLETE: Primary | ICD-10-CM

## 2025-04-10 RX ORDER — TRAZODONE HYDROCHLORIDE 50 MG/1
1 TABLET ORAL NIGHTLY PRN
COMMUNITY
Start: 2025-04-04 | End: 2025-05-04

## 2025-04-10 RX ORDER — UBIDECARENONE 100 MG
100 CAPSULE ORAL DAILY
COMMUNITY

## 2025-04-11 NOTE — PROGRESS NOTES
Cardiac Electrophysiology Outpatient Note  [facility] at [facility]    Office Visit     Tristin Cantu  [id number]  01/03/2025    Primary Care Physician: Allen Navarrete MD    Referred By: Allen Navarrete MD    Subjective     Chief Complaint   Patient presents with    AV block, complete    Follow-up     3 month follow up       History of Present Illness:   Tristin Cantu is a 74 y.o. male who presents to my electrophysiology clinic for follow up of  2nd Degree AVB with PM check and recent diagnosis of atrial fibrillation via device transmission. His pacemaker was implanted in January 2023 after being admitted with presyncope and found to have 2nd degree AVB.  He was found to have rare episodes of atrial fibrillation on his device and started on Eliquis.    More recently he was diagnosed with renal cell carcinoma and brought back in the clinic for reevaluation for possible resection.  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  He continues to have significant shortness of breath on exertion.  This has been present on our prior visits as well.  This is progressed over the past year or so for being fairly active to having shortness of breath now with minimal exertion.  He has undergone fairly extensive workup for this.  He underwent recent left heart cath with Dr. Ogden which was not found to have any critical coronary lesions.  Echocardiogram has been essentially normal.  Previously had a very mildly reduced ejection fraction.  Pulmonary function testing was also normal.    He does say over the past few days he thinks his shortness of breath has been a little bit better.  He thinks this may be related to switching from the Eliquis to Lovenox for his tumor thrombus.  Continues to drink about 10-12 beers per day and is not interested on cutting back.    Past Medical History:   Diagnosis Date    Abnormal ECG 2023     Cancer     Coronary artery disease involving native coronary artery of native heart without angina pectoris 3/24/2025    Cardiac catheterization (3/24/2025): Mild coronary artery disease.  Minimally elevated LV filling pressure (LVEDP 13 mmHg).  Abnormality on nuclear stress test diaphragmatic attenuation artifact.    Paroxysmal atrial fibrillation 1/3/2025       Past Surgical History:   Procedure Laterality Date    CARDIAC CATHETERIZATION Right 3/24/2025    Procedure: Left Heart Cath - Right radial access;  Surgeon: Wellington Ogden IV, MD;  Location:  [facility] INVAS[facility];  Service: Cardiovascular;  Laterality: Right;    CARDIAC ELECTROPHYSIOLOGY PROCEDURE N/A 01/23/2023    Procedure: Pacemaker DC new;  Surgeon: Rick Villatoro MD;  Location:  [facility] INVASIVE LOCATION;  Service: Cardiology;  Laterality: N/A;    CATARACT EXTRACTION      both eyes 2024    COLONOSCOPY  06/2023    INSERT / REPLACE / REMOVE PACEMAKER  2023    TOE SURGERY Left 12/2023    @ [facility]       Family History   Problem Relation Age of Onset    Cancer Mother     Cancer Father        Social History     Socioeconomic History    Marital status:    Tobacco Use    Smoking status: Never     Passive exposure: Never    Smokeless tobacco: Never   Vaping Use    Vaping status: Never Used   Substance and Sexual Activity    Alcohol use: Yes     Alcohol/week: 9.0 standard drinks of alcohol     Types: 9 Cans of beer per week     Comment: occas    Drug use: Never    Sexual activity: Not Currently     Partners: Female     Birth control/protection: None         Current Outpatient Medications:     aspirin 81 MG EC tablet, Take 1 tablet by mouth Daily., Disp: 90 tablet, Rfl: 3    esomeprazole (nexIUM) 20 MG capsule, Take 1 capsule by mouth Every Morning Before Breakfast., Disp: , Rfl:     fluticasone (FLONASE) 50 MCG/ACT nasal spray, Administer 2 sprays into the nostril(s) as directed by provider Daily., Disp: , Rfl:      "lisinopril (PRINIVIL,ZESTRIL) 40 MG tablet, Take 1 tablet by mouth Daily., Disp: , Rfl:     metoprolol tartrate (LOPRESSOR) 25 MG tablet, Take 0.5 tablets by mouth 2 (Two) Times a Day., Disp: 60 tablet, Rfl: 2    Multivitamin tablet tablet, Take 1 tablet by mouth Daily., Disp: , Rfl:     Omega-3 Fatty Acids (fish oil) 1000 MG capsule capsule, Take 1 capsule by mouth Daily With Breakfast., Disp: , Rfl:     rosuvastatin (CRESTOR) 20 MG tablet, Take 1 tablet by mouth Daily., Disp: , Rfl:     sertraline (ZOLOFT) 50 MG tablet, Take 1 tablet by mouth Daily., Disp: , Rfl:     traZODone (DESYREL) 50 MG tablet, Take 1 tablet by mouth At Night As Needed., Disp: , Rfl:     VITAMIN D PO, Take 1 tablet by mouth Daily., Disp: , Rfl:     apixaban (ELIQUIS) 5 MG tablet tablet, Take 1 tablet by mouth 2 (Two) Times a Day. (Patient not taking: Reported on 4/10/2025), Disp: , Rfl:     coenzyme Q10 100 MG capsule, Take 1 capsule by mouth Daily., Disp: , Rfl:     isosorbide mononitrate (IMDUR) 30 MG 24 hr tablet, Take 1 tablet by mouth Every Morning. (Patient not taking: Reported on 4/10/2025), Disp: 30 tablet, Rfl: 0    nitroglycerin (Nitrostat) 0.4 MG SL tablet, Place 1 tablet under the tongue Every 5 (Five) Minutes As Needed for Chest Pain. Take no more than 3 doses in 15 minutes., Disp: 25 tablet, Rfl: 12    thiamine (VITAMIN B-1) 100 MG tablet tablet, Take 1 tablet by mouth Daily. (Patient not taking: Reported on 4/10/2025), Disp: , Rfl:     Allergies:   Allergies   Allergen Reactions    Rosuvastatin Myalgia    Quinine Hives       Objective   Vital Signs: Blood pressure 148/84, pulse 69, height 177.8 cm (70\"), weight 118 kg (259 lb 4.8 oz), SpO2 98%.    PHYSICAL EXAM  General appearance: Awake, alert, cooperative  Head: Normocephalic, without obvious abnormality, atraumatic  Lungs: Clear to ascultation bilaterally  Heart: Regular rate and rhythm, no murmurs, no lower extremity swelling  Skin: Skin color, turgor normal, no rashes or " lesions  Neurologic: Grossly normal     Lab Results   Component Value Date    GLUCOSE 107 (H) 03/18/2025    CALCIUM 9.4 03/18/2025     03/18/2025    K 4.5 03/18/2025    CO2 26.6 03/18/2025    CL 97 (L) 03/18/2025    BUN 13 03/18/2025    CREATININE 1.10 03/31/2025    BCR 12.9 03/18/2025    ANIONGAP 12.4 03/18/2025     Lab Results   Component Value Date    WBC 8.15 04/03/2025    HGB 11.8 (L) 04/03/2025    HCT 35.6 (L) 04/03/2025    MCV 87 04/03/2025     04/03/2025     Lab Results   Component Value Date    INR 1.3 (H) 04/01/2025    INR 1.3 (H) 04/01/2025     Lab Results   Component Value Date    TSH 2.230 01/20/2023          Results for orders placed during the hospital encounter of 02/12/25    Adult Transthoracic Echo Complete W/ Cont if Necessary Per Protocol    Interpretation Summary    Left ventricular systolic function is normal. Estimated left ventricular EF = 60%    Left ventricular wall thickness is consistent with mild concentric hypertrophy.    Left ventricular diastolic function is consistent with (grade I) impaired relaxation.    No significant valvular abnormalities.     Results for orders placed during the hospital encounter of 03/24/25    Cardiac Catheterization/Vascular Study    Conclusion    Mild coronary artery disease with exception of moderate stenosis of small caliber first diagonal branch.    Minimally elevated LV filling pressure (LVEDP 13 mmHg)    Abnormality on nuclear stress test related diaphragmatic attenuation artifact.      I personally viewed and interpreted the patient's EKG/Telemetry/lab data      ECG 12 Lead    Date/Time: 4/10/2025 8:35 PM  Performed by: Rick Villatoro MD    Authorized by: Rick Villatoro MD  Comparison: compared with previous ECG from 3/18/2025  Similar to previous ECG  Comments: Atrial sensed, ventricular paced rhythm          Tristin Cantu  reports that he has never smoked. He has never been exposed to tobacco smoke. He has never used smokeless  tobacco.        Advance Care Planning   Advance Care Planning: ACP discussion was declined by the patient. Patient does not have an advance directive, information provided.     Assessment & Plan    1. AV block, complete  S/p DC PPM    2. Presence of cardiac pacemaker  The patient's [employer] dual-chamber pacemaker was interrogated in the office today demonstrating normal device function with 8 years left on the battery, 14% atrial pacing, >99% RV pacing.  Ejection fraction has been normal despite high RV pacing burden.  No recent episodes of atrial fibrillation.  He did have an abrupt increase in his atrial fibrillation burden in the middle of March, which has now gone back down to baseline.  Uncertain the etiology of this.  This was associated with a decrease in his overall activity level.    We did increase his lower rate limit to 70 and increased his sensor threshold to see if this improves his shortness of breath prior to his surgery.    3. Paroxysmal atrial fibrillation  Patient had a single 5 and half hour episode of atrial fibrillation in late November.  This is the only time he has had atrial fibrillation.  He was started on Eliquis.  This is since been switched to Lovenox after diagnosis of his renal cell carcinoma.  No recurrent episodes of atrial fibrillation.  Continues fairly heavy alcohol use which will definitely contribute.    4. Dyspnea on exertion  Continues to have significant dyspnea on exertion despite extensive workup.  This is actually slightly better recently.  No clear cardiac etiology for his dyspnea has been found.  Pulmonary function testing is also been normal.  As above we increased his lower rate limit in his sensor threshold to see if this improves his symptoms.    5. Primary hypertension  BP elevated in the office today.  At home it seems to have been more controlled.  Recommend continued blood pressure checks at home.    Follow Up:  No follow-ups on file.      Thank you for  allowing me to participate in the care of your patient. Please do not hesitate to contact me with additional questions or concerns.      Rick Villatoro MD  Cardiac Electrophysiology  [facility]

## 2025-05-07 ENCOUNTER — READMISSION MANAGEMENT (OUTPATIENT)
Dept: CALL CENTER | Facility: HOSPITAL | Age: 75
End: 2025-05-07
Payer: MEDICARE

## 2025-05-08 ENCOUNTER — TRANSITIONAL CARE MANAGEMENT TELEPHONE ENCOUNTER (OUTPATIENT)
Dept: CALL CENTER | Facility: HOSPITAL | Age: 75
End: 2025-05-08
Payer: MEDICARE

## 2025-05-08 RX ORDER — AMLODIPINE BESYLATE 5 MG/1
5 TABLET ORAL DAILY
Qty: 90 TABLET | Refills: 1 | Status: SHIPPED | OUTPATIENT
Start: 2025-05-08

## 2025-05-08 RX ORDER — ONDANSETRON 4 MG/1
4 TABLET, ORALLY DISINTEGRATING ORAL EVERY 8 HOURS PRN
Qty: 60 TABLET | Refills: 1 | Status: SHIPPED | OUTPATIENT
Start: 2025-05-08

## 2025-05-08 RX ORDER — METHOCARBAMOL 750 MG/1
750 TABLET, FILM COATED ORAL 4 TIMES DAILY PRN
Qty: 120 TABLET | Refills: 0 | Status: SHIPPED | OUTPATIENT
Start: 2025-05-08

## 2025-05-08 NOTE — OUTREACH NOTE
Call Center TCM Note      Flowsheet Row Responses   [facility] patient discharged from? Non-  []   Does the patient have one of the following disease processes/diagnoses(primary or secondary)? Other   TCM attempt successful? Yes   Call start time 1233   Call end time 1238   Discharge diagnosis Renal cell carcinoma of right kidney   Person spoke with today (if not patient) and relationship Spouse-Rosa   Meds reviewed with patient/caregiver? Yes   Is the patient having any side effects they believe may be caused by any medication additions or changes? No   Does the patient have all medications ordered at discharge? Yes   Is the patient taking all medications as directed (includes completed medication regime)? Yes   Medication comments PCP has ordered a muscle relaxer since d/c   Does the patient have an appointment with their PCP within 7-14 days of discharge? No   Nursing Interventions Patient declined scheduling/rescheduling appointment at this time  [Pt spouse reports she has been in contact with PCP, no need for f/u appt at this time]   Has home health visited the patient within 72 hours of discharge? N/A   Psychosocial issues? No   Did the patient receive a copy of their discharge instructions? Yes   What is the patient's perception of their health status since discharge? Improving   Is the patient/caregiver able to teach back the hierarchy of who to call/visit for symptoms/problems? PCP, Specialist, Home health nurse, Urgent Care, ED, 911 Yes   TCM call completed? Yes   Call end time 1238   Would this patient benefit from a Referral to Amb Social Work? No   Is the patient interested in additional calls from an ambulatory ? No            Monique PINA - Registered Nurse    5/8/2025, 12:38 EDT

## 2025-05-08 NOTE — OUTREACH NOTE
Prep Survey      Flowsheet Row Responses   [facility] patient discharged from? Non-BH   Is LACE score < 7 ? Non-BH Discharge   Eligibility [facility]   Date of Admission 05/01/25   Date of Discharge 05/07/25   Discharge Disposition Home or Self Care   Discharge diagnosis Renal cell carcinoma of right kidney   Does the patient have one of the following disease processes/diagnoses(primary or secondary)? Other   Prep survey completed? Yes            Yvette PINA - Registered Nurse

## 2025-05-09 ENCOUNTER — TELEPHONE (OUTPATIENT)
Dept: INTERNAL MEDICINE | Age: 75
End: 2025-05-09

## 2025-05-09 NOTE — TELEPHONE ENCOUNTER
Provider:     DR DOUGLAS    Caller: ADAN    Relationship to Patient:     [facility]    Phone Number:       [phone]     Reason for Call:     CALLER STATED A REFERRAL FOR PATIENT TO RECEIVE HOME HEALTH CARE WAS RECEIVED FROM THE HOSPITAL    CALLER STATED THE REFERRAL IS FOR:    NURSING  PT  OT    CALLER STATED PATIENT REQUESTED THE HOME HEALTH CARE BEGIN NEXT WEEK INSTEAD OF THIS WEEK, AND HE DID NOT WANT TO RECEIVE THE NURSING CARE

## 2025-05-14 ENCOUNTER — TELEPHONE (OUTPATIENT)
Dept: INTERNAL MEDICINE | Age: 75
End: 2025-05-14
Payer: MEDICARE

## 2025-05-14 RX ORDER — MULTIVIT WITH MINERALS/LUTEIN
1000 TABLET ORAL DAILY
Qty: 30 TABLET | Refills: 2 | Status: SHIPPED | OUTPATIENT
Start: 2025-05-14

## 2025-05-14 RX ORDER — FERROUS GLUCONATE 324(38)MG
324 TABLET ORAL
Qty: 15 TABLET | Refills: 2 | Status: SHIPPED | OUTPATIENT
Start: 2025-05-14

## 2025-05-14 RX ORDER — TRAZODONE HYDROCHLORIDE 50 MG/1
50 TABLET ORAL NIGHTLY PRN
Qty: 90 TABLET | Refills: 2 | Status: SHIPPED | OUTPATIENT
Start: 2025-05-14 | End: 2026-02-08

## 2025-05-15 ENCOUNTER — TELEMEDICINE (OUTPATIENT)
Dept: INTERNAL MEDICINE | Age: 75
End: 2025-05-15
Payer: MEDICARE

## 2025-05-15 DIAGNOSIS — I10 PRIMARY HYPERTENSION: ICD-10-CM

## 2025-05-15 DIAGNOSIS — Z91.89 AT RISK FOR INFECTION ASSOCIATED WITH FOLEY CATHETER: ICD-10-CM

## 2025-05-15 DIAGNOSIS — C64.1 RENAL CELL CARCINOMA, RIGHT: Primary | ICD-10-CM

## 2025-05-15 DIAGNOSIS — D63.8 ANEMIA, CHRONIC DISEASE: ICD-10-CM

## 2025-05-15 DIAGNOSIS — R53.83 OTHER FATIGUE: ICD-10-CM

## 2025-05-15 DIAGNOSIS — Z90.5 H/O RIGHT NEPHRECTOMY: ICD-10-CM

## 2025-05-15 NOTE — PROGRESS NOTES
Transitional Care Follow Up Visit  Subjective     Tristin Cantu is a 74 y.o. male who presents for a transitional care management visit.    This is video visit.  You have chosen to receive care through a video visit today. Do you consent to use a video visit for your medical care today? Yes    Within 48 business hours after discharge our office contacted him via telephone to coordinate his care and needs.      I reviewed and discussed the details of that call along with the discharge summary, hospital problems, inpatient lab results, inpatient diagnostic studies, and consultation reports with Tristin.     Current outpatient and discharge medications have been reconciled for the patient.  Reviewed by: MAR Gee          5/7/2025     8:24 PM   Date of TCM Phone Call   [facility]   Date of Admission 5/1/2025   Date of Discharge 5/7/2025   Discharge Disposition Home or Self Care     Risk for Readmission (LACE) No data recorded    Course During Hospital Stay:  6 days    History of Present Illness  The patient presents via virtual visit for evaluation of hypertension, acid reflux, anxiety, and iron deficiency.    He reports a general improvement in his condition today, although he experienced a minor setback yesterday, characterized by a feeling of malaise. He has been engaging in daily walks but refrained from this activity yesterday due to his discomfort. His blood pressure readings have been around 150/82, which he attributes to physical activity prior to measurement. He believes his blood pressure will stabilize and plans to continue monitoring it. He also notes that his systolic blood pressure tends to decrease when he is at rest.    His appetite has been suboptimal, but he reports a gradual improvement. He experienced an episode of acid reflux last night, necessitating him to sit upright in a chair. He has been on Nexium for several years and also takes an antacid. He expresses  confidence in managing his condition with these medications.    He initiated iron supplementation yesterday, which he suspects may have contributed to his feeling of unwellness. He recalls a previous instance where iron supplementation induced nausea. However, he reports no such adverse effects this time and feels significantly better today. He plans to continue with the iron supplementation. He has not yet received his vitamin C supplement but has been consuming fruits.    He has not heard back from [facility] provider on pathology as of yet.         I have reviewed the following portions of the patient's history and confirmed they are accurate: allergies, current medications, past family history, past medical history, past social history, past surgical history, and problem list    Review of Systems  Pertinent items are noted in HPI.     Current Outpatient Medications on File Prior to Visit   Medication Sig    amLODIPine (Norvasc) 5 MG tablet Take 1 tablet by mouth Daily.    ascorbic acid (VITAMIN C) 1000 MG tablet Take 1 tablet by mouth Daily.    aspirin 81 MG EC tablet Take 1 tablet by mouth Daily.    coenzyme Q10 100 MG capsule Take 1 capsule by mouth Daily.    esomeprazole (nexIUM) 20 MG capsule Take 1 capsule by mouth Every Morning Before Breakfast.    ferrous gluconate (FERGON) 324 MG tablet Take 1 tablet by mouth Every Other Day.    fluticasone (FLONASE) 50 MCG/ACT nasal spray Administer 2 sprays into the nostril(s) as directed by provider Daily.    methocarbamol (ROBAXIN) 750 MG tablet Take 1 tablet by mouth 4 (Four) Times a Day As Needed for Muscle Spasms.    Multivitamin tablet tablet Take 1 tablet by mouth Daily.    Omega-3 Fatty Acids (fish oil) 1000 MG capsule capsule Take 1 capsule by mouth Daily With Breakfast.    ondansetron ODT (ZOFRAN-ODT) 4 MG disintegrating tablet Place 1 tablet on the tongue Every 8 (Eight) Hours As Needed for Nausea or Vomiting.    rosuvastatin (CRESTOR) 20 MG tablet Take 1 tablet by  mouth Daily.    sertraline (ZOLOFT) 50 MG tablet Take 1 tablet by mouth Daily.    traZODone (DESYREL) 50 MG tablet Take 1 tablet by mouth At Night As Needed for Sleep for up to 270 days.    VITAMIN D PO Take 1 tablet by mouth Daily.     No current facility-administered medications on file prior to visit.       Objective   There were no vitals filed for this visit.  There is no height or weight on file to calculate BMI.    Physical Exam  Constitutional:       Appearance: Normal appearance. He is well-developed.   HENT:      Head: Normocephalic and atraumatic.      Nose: Nose normal.   Eyes:      General: Lids are normal.      Conjunctiva/sclera: Conjunctivae normal.   Neck:      Trachea: Trachea normal.   Pulmonary:      Effort: No respiratory distress.   Neurological:      Mental Status: He is alert and oriented to person, place, and time.      GCS: GCS eye subscore is 4. GCS verbal subscore is 5. GCS motor subscore is 6.   Psychiatric:         Attention and Perception: Attention normal.         Mood and Affect: Mood normal.         Speech: Speech normal.         Behavior: Behavior normal. Behavior is cooperative.         Thought Content: Thought content normal.         Results  Labs   - Cholesterol level: Below 100    Lab Results   Component Value Date    WBC 9.7 05/07/2025    HGB 8.6 (L) 05/07/2025    HCT 26.2 (L) 05/07/2025    MCV 86 05/07/2025     05/07/2025      Lab Results   Component Value Date    GLUCOSE 184 (H) 05/01/2025    BUN 13 03/18/2025    CREATININE 1.10 03/31/2025     (L) 05/01/2025    K 4.5 03/18/2025     05/01/2025    CALCIUM 9.4 03/18/2025    PROTEINTOT 8.2 03/18/2025    ALBUMIN 4.2 03/18/2025    ALT 25 03/18/2025    AST 28 03/18/2025    ALKPHOS 131 (H) 03/18/2025    BILITOT 0.5 03/18/2025    GLOB 4.0 03/18/2025    AGRATIO 1.1 03/18/2025    BCR 12.9 03/18/2025    ANIONGAP 12.4 03/18/2025    EGFR 79.0 03/18/2025      Lab Results   Component Value Date    HGBA1C 5.6 04/16/2025       Lab Results   Component Value Date    CHOL 159 03/18/2025    TRIG 79 03/18/2025    HDL 51 03/18/2025    LDL 93 03/18/2025      Lab Results   Component Value Date    TSH 2.230 01/20/2023        Assessment & Plan   Problem List Items Addressed This Visit       Primary hypertension    Overview   Target blood pressure <130/80 mmHg         Relevant Orders    Comprehensive Metabolic Panel (Completed)    CBC Auto Differential (Completed)     Other Visit Diagnoses         Renal cell carcinoma, right    -  Primary    Relevant Orders    Comprehensive Metabolic Panel (Completed)    Urinalysis With Microscopic - Urine, Clean Catch (Completed)    Iron Profile (Completed)    CBC Auto Differential (Completed)      Anemia, chronic disease        Relevant Orders    Comprehensive Metabolic Panel (Completed)    Iron Profile (Completed)    CBC Auto Differential (Completed)      Other fatigue        Relevant Orders    Urinalysis With Microscopic - Urine, Clean Catch (Completed)      H/O right nephrectomy        Relevant Orders    Comprehensive Metabolic Panel (Completed)    Urinalysis With Microscopic - Urine, Clean Catch (Completed)    Iron Profile (Completed)    CBC Auto Differential (Completed)      At risk for infection associated with Ladd catheter        Relevant Orders    Urinalysis With Microscopic - Urine, Clean Catch (Completed)               Current Outpatient Medications:     amLODIPine (Norvasc) 5 MG tablet, Take 1 tablet by mouth Daily., Disp: 90 tablet, Rfl: 1    ascorbic acid (VITAMIN C) 1000 MG tablet, Take 1 tablet by mouth Daily., Disp: 30 tablet, Rfl: 2    aspirin 81 MG EC tablet, Take 1 tablet by mouth Daily., Disp: 90 tablet, Rfl: 3    coenzyme Q10 100 MG capsule, Take 1 capsule by mouth Daily., Disp: , Rfl:     esomeprazole (nexIUM) 20 MG capsule, Take 1 capsule by mouth Every Morning Before Breakfast., Disp: 90 capsule, Rfl: 2    ferrous gluconate (FERGON) 324 MG tablet, Take 1 tablet by mouth Every Other  Day., Disp: 15 tablet, Rfl: 2    fluticasone (FLONASE) 50 MCG/ACT nasal spray, Administer 2 sprays into the nostril(s) as directed by provider Daily., Disp: , Rfl:     methocarbamol (ROBAXIN) 750 MG tablet, Take 1 tablet by mouth 4 (Four) Times a Day As Needed for Muscle Spasms., Disp: 120 tablet, Rfl: 0    Multivitamin tablet tablet, Take 1 tablet by mouth Daily., Disp: , Rfl:     Omega-3 Fatty Acids (fish oil) 1000 MG capsule capsule, Take 1 capsule by mouth Daily With Breakfast., Disp: , Rfl:     ondansetron ODT (ZOFRAN-ODT) 4 MG disintegrating tablet, Place 1 tablet on the tongue Every 8 (Eight) Hours As Needed for Nausea or Vomiting., Disp: 60 tablet, Rfl: 1    rosuvastatin (CRESTOR) 20 MG tablet, Take 1 tablet by mouth Daily., Disp: , Rfl:     sertraline (ZOLOFT) 50 MG tablet, Take 1 tablet by mouth Daily., Disp: , Rfl:     traZODone (DESYREL) 50 MG tablet, Take 1 tablet by mouth At Night As Needed for Sleep for up to 270 days., Disp: 90 tablet, Rfl: 2    VITAMIN D PO, Take 1 tablet by mouth Daily., Disp: , Rfl:     Assessment & Plan  1. Hypertension.  - His blood pressure readings have been consistently above the desired range, with the most recent reading being 150/82. The target systolic blood pressure is <140.  - He is advised to continue monitoring his blood pressure regularly.    2. Acid reflux.  - He is currently taking Nexium daily and has been for years.  - He reports that he can manage his symptoms with this medication. No additional medication is needed at this time.    3. Iron deficiency.  - He started taking an iron supplement yesterday but experienced stomach discomfort.  - He is advised to take the iron supplement with food to minimize gastrointestinal upset.  - A different version of the iron supplement can be called into a special pharmacy if needed.  - He is also advised to take vitamin C with the iron supplement to enhance absorption.    4. Health maintenance.  - Blood work will be ordered  to assess his blood counts and kidney function.  - The results will be reviewed, and any abnormalities will be addressed accordingly.  - The pathology report from his recent surgery will be followed up on today, and a copy will be obtained for his records.         Plan of care reviewed with the patient at the conclusion of today's visit.  Education was provided regarding diagnosis, management, and any prescribed or recommended OTC medications.  Patient verbalized understanding of and agreement with management plan.     Return in 1 month (on 6/15/2025), or if symptoms worsen or fail to improve, for Follow-up.    [place], [place] in [place]. I spent 25 minutes in medical discussion with patient during this visit.     Transcribed from ambient dictation for MAR Mcfarland by MAR Mcfarland.  05/15/25   14:25 EDT    Patient or patient representative verbalized consent for the use of Ambient Listening during the visit with  MAR Mcfarland for chart documentation. 5/23/2025  15:35 EDT

## 2025-05-22 DIAGNOSIS — C64.1 RENAL CELL CARCINOMA, RIGHT: Primary | ICD-10-CM

## 2025-05-22 DIAGNOSIS — R41.0 CONFUSION: ICD-10-CM

## 2025-05-22 DIAGNOSIS — R26.81 UNSTABLE GAIT: ICD-10-CM

## 2025-05-27 ENCOUNTER — TELEPHONE (OUTPATIENT)
Dept: INTERNAL MEDICINE | Age: 75
End: 2025-05-27

## 2025-05-27 NOTE — TELEPHONE ENCOUNTER
Caller: LUCIA CABRALES    Relationship: Emergency Contact    Best call back number:      [phone] (Home)     Who are you requesting to speak with (clinical staff, provider,  specific staff member  [facility]     What was the call regarding: RESCHEDULE APPOINTMENT FOR  MRI OF THE BRAIN      PLEASE CALL

## 2025-05-29 ENCOUNTER — TELEPHONE (OUTPATIENT)
Dept: INTERNAL MEDICINE | Age: 75
End: 2025-05-29
Payer: MEDICARE

## 2025-05-29 NOTE — TELEPHONE ENCOUNTER
[facility] called and asked for ov notes pertaining to the pt's low oxygen. I printed off the notes from 2/24/25 visit with , one of the main dx was shortness of breath, so I faxed that note along with the telemedicine visit from 5/15/25, gerard.    Faxed to [phone]

## 2025-05-30 ENCOUNTER — TELEPHONE (OUTPATIENT)
Dept: INTERNAL MEDICINE | Age: 75
End: 2025-05-30
Payer: MEDICARE

## 2025-05-30 NOTE — TELEPHONE ENCOUNTER
Hub staff attempted to follow warm transfer process and was unsuccessful     Caller: LUCIA CABRALES    Relationship to patient: Emergency Contact    Best call back number: [phone]    Patient is needing: LUCIA WAS RETURNING CALL TO [facility] ABOUT A BRAIN SCAN. PLEASE CALL LUCIA BACK.

## 2025-06-02 NOTE — TELEPHONE ENCOUNTER
Called Rosa back to let her know we are still waiting to hear from the MRI dept b/c they will not let us schedule the pt until we have Rafael from the MRI scheduling supervisor. Rosa understood and will look out for a call from me or the scheduling dept. gerard

## 2025-06-04 ENCOUNTER — TELEPHONE (OUTPATIENT)
Dept: INTERNAL MEDICINE | Age: 75
End: 2025-06-04
Payer: MEDICARE

## 2025-06-04 NOTE — TELEPHONE ENCOUNTER
Yusef called the office back to check on the MRI., I spoke to radha the practice manager since she has been helping getting this appt scheduled. Radha spoke to the MRI supervisor yesterday and they let het know they are waiting for bertin from dr. Wolff office before they will schedule the pt. I informed yusef of what was said and she understood, gerard

## 2025-06-04 NOTE — TELEPHONE ENCOUNTER
Maria M from [facility] is calling to get office notes with oxygen saturation to support orders received on 4/8/2025.  She can reached at [phone] and fax number is [phone]

## 2025-06-04 NOTE — TELEPHONE ENCOUNTER
Antonietta in Radiology scheduling said they are waiting on Dr Wolff office to send a clearance form. She will update me once she has that.

## 2025-06-05 ENCOUNTER — HOSPITAL ENCOUNTER (OUTPATIENT)
Facility: HOSPITAL | Age: 75
Discharge: HOME | End: 2025-06-05
Payer: MEDICARE

## 2025-06-05 ENCOUNTER — TELEPHONE (OUTPATIENT)
Dept: INTERNAL MEDICINE | Age: 75
End: 2025-06-05
Payer: MEDICARE

## 2025-06-05 NOTE — TELEPHONE ENCOUNTER
Caller: [employer]    Relationship: Other    Best call back number: [phone]     What was the call regarding: RECEIVED AN ORDER FOR AN OVERNIGHT O2 STUDY AND IS NEEDING TO KNOW IF IT WILL BE ON ROOM AIR OR OXYGEN. PLEASE CALL TO ADVISE

## 2025-06-10 ENCOUNTER — TELEMEDICINE (OUTPATIENT)
Dept: INTERNAL MEDICINE | Age: 75
End: 2025-06-10
Payer: MEDICARE

## 2025-06-10 VITALS — RESPIRATION RATE: 16 BRPM

## 2025-06-10 DIAGNOSIS — D36.9 TUBULAR ADENOMA: ICD-10-CM

## 2025-06-10 DIAGNOSIS — I10 PRIMARY HYPERTENSION: Primary | ICD-10-CM

## 2025-06-10 DIAGNOSIS — I25.10 CORONARY ARTERY DISEASE INVOLVING NATIVE CORONARY ARTERY OF NATIVE HEART WITHOUT ANGINA PECTORIS: ICD-10-CM

## 2025-06-10 DIAGNOSIS — E78.5 HYPERLIPIDEMIA LDL GOAL <50: ICD-10-CM

## 2025-06-10 DIAGNOSIS — K57.90 DIVERTICULOSIS: ICD-10-CM

## 2025-06-10 DIAGNOSIS — I48.0 PAROXYSMAL ATRIAL FIBRILLATION: ICD-10-CM

## 2025-06-10 DIAGNOSIS — F51.01 PRIMARY INSOMNIA: ICD-10-CM

## 2025-06-10 RX ORDER — PRAVASTATIN SODIUM 40 MG
40 TABLET ORAL NIGHTLY
Qty: 90 TABLET | Refills: 3 | Status: SHIPPED | OUTPATIENT
Start: 2025-06-10

## 2025-06-10 RX ORDER — ZOLPIDEM TARTRATE 10 MG/1
TABLET ORAL
Qty: 30 TABLET | Refills: 2 | Status: SHIPPED | OUTPATIENT
Start: 2025-06-10

## 2025-06-10 NOTE — PROGRESS NOTES
"Patient is a 74 y.o. male who is here for a follow up of   Chief Complaint   Patient presents with    Insomnia    Hypertension    Hyperlipidemia     This was an audio and video enabled telemedicine encounter.      HPI:    Asked to do televisit sec to distance to office and taxing effort to make it here.  Recently underwent right radical nephrectomy.  Recently seen at [facility] and plans to start Keytruda as directed by [facility].  His breathing is much better.  Walking about 1 mile per day.  Appetite is better.    Issue with poor sleep.  Tried Trazodone but ineffective and made him groggy in the AM.  Thinks that several nights of good sleep will reset the clock and will only use temporarily.  Feels \"jittery\".  Feels his muscle ache more on the Crestor than the Pravachol.     History:     Patient Active Problem List   Diagnosis    Carotid artery stenosis, symptomatic, left    Primary hypertension    Hyperlipidemia LDL goal <50    JUDIT (obstructive sleep apnea)    Class 2 obesity due to excess calories with body mass index (BMI) of 35.0 to 35.9 in adult    AV block, complete    Presence of cardiac pacemaker    Dyspnea on exertion    Paroxysmal atrial fibrillation    Uncomplicated alcohol dependence    Tubular adenoma    Diverticulosis    Coronary artery disease involving native coronary artery of native heart without angina pectoris       Past Medical History:   Diagnosis Date    Abnormal ECG 2023    Cancer     Coronary artery disease involving native coronary artery of native heart without angina pectoris 3/24/2025    Cardiac catheterization (3/24/2025): Mild coronary artery disease.  Minimally elevated LV filling pressure (LVEDP 13 mmHg).  Abnormality on nuclear stress test diaphragmatic attenuation artifact.    Paroxysmal atrial fibrillation 1/3/2025       Past Surgical History:   Procedure Laterality Date    CARDIAC CATHETERIZATION Right 3/24/2025    Procedure: Left Heart Cath - Right radial access;  Surgeon: " Wellington Ogden IV, MD;  Location:  [facility];  Service: Cardiovascular;  Laterality: Right;    CARDIAC ELECTROPHYSIOLOGY PROCEDURE N/A 01/23/2023    Procedure: Pacemaker DC new;  Surgeon: Rick Villatoro MD;  Location:  [facility];  Service: Cardiology;  Laterality: N/A;    CATARACT EXTRACTION      both eyes 2024    COLONOSCOPY  06/2023    INSERT / REPLACE / REMOVE PACEMAKER  2023    TOE SURGERY Left 12/2023    @ [facility]       Current Outpatient Medications on File Prior to Visit   Medication Sig    amLODIPine (Norvasc) 5 MG tablet Take 1 tablet by mouth Daily.    ascorbic acid (VITAMIN C) 1000 MG tablet Take 1 tablet by mouth Daily.    aspirin 81 MG EC tablet Take 1 tablet by mouth Daily.    coenzyme Q10 100 MG capsule Take 1 capsule by mouth Daily.    esomeprazole (nexIUM) 20 MG capsule Take 1 capsule by mouth Every Morning Before Breakfast.    methocarbamol (ROBAXIN) 750 MG tablet Take 1 tablet by mouth 4 (Four) Times a Day As Needed for Muscle Spasms.    Multivitamin tablet tablet Take 1 tablet by mouth Daily.    Omega-3 Fatty Acids (fish oil) 1000 MG capsule capsule Take 1 capsule by mouth Daily With Breakfast.    ondansetron ODT (ZOFRAN-ODT) 4 MG disintegrating tablet Place 1 tablet on the tongue Every 8 (Eight) Hours As Needed for Nausea or Vomiting.    sertraline (ZOLOFT) 50 MG tablet Take 1 tablet by mouth Daily.    VITAMIN D PO Take 1 tablet by mouth Daily.    [DISCONTINUED] rosuvastatin (CRESTOR) 20 MG tablet Take 1 tablet by mouth Daily.    fluticasone (FLONASE) 50 MCG/ACT nasal spray Administer 2 sprays into the nostril(s) as directed by provider Daily. (Patient not taking: Reported on 6/10/2025)    [DISCONTINUED] ferrous gluconate (FERGON) 324 MG tablet Take 1 tablet by mouth Every Other Day. (Patient not taking: Reported on 6/10/2025)    [DISCONTINUED] traZODone (DESYREL) 50 MG tablet Take 1 tablet by mouth At Night As Needed for Sleep for up to  270 days. (Patient not taking: Reported on 6/10/2025)     No current facility-administered medications on file prior to visit.       Family History   Problem Relation Age of Onset    Cancer Mother     Cancer Father        Social History     Socioeconomic History    Marital status:    Tobacco Use    Smoking status: Never     Passive exposure: Never    Smokeless tobacco: Never   Vaping Use    Vaping status: Never Used   Substance and Sexual Activity    Alcohol use: Yes     Alcohol/week: 9.0 standard drinks of alcohol     Types: 9 Cans of beer per week     Comment: occas    Drug use: Never    Sexual activity: Not Currently     Partners: Female     Birth control/protection: None         Review of Systems   Constitutional:  Positive for fatigue. Negative for chills, fever and unexpected weight change.   HENT:  Negative for congestion, ear pain, hearing loss, rhinorrhea, sinus pressure, sore throat and trouble swallowing.    Eyes:  Negative for discharge and itching.   Respiratory:  Negative for cough and chest tightness.    Cardiovascular:  Negative for chest pain, palpitations and leg swelling.        Followed by [facility]   Gastrointestinal:  Negative for abdominal pain, blood in stool, constipation, diarrhea and vomiting.        6/23 colonoscopy with TA times one   Endocrine: Negative for polydipsia and polyuria.   Genitourinary:  Negative for difficulty urinating, dysuria, enuresis, frequency, hematuria and urgency.   Musculoskeletal:  Positive for arthralgias. Negative for back pain, gait problem and joint swelling.   Skin:  Negative for rash and wound.   Allergic/Immunologic: Negative for immunocompromised state.   Neurological:  Negative for dizziness, syncope, weakness, light-headedness, numbness and headaches.   Hematological:  Does not bruise/bleed easily.   Psychiatric/Behavioral:  Positive for sleep disturbance. Negative for behavioral problems and dysphoric mood. The patient is not  nervous/anxious.        Resp 16       Physical Exam  Constitutional:       General: He is not in acute distress.     Appearance: Normal appearance. He is obese. He is not ill-appearing or diaphoretic.   HENT:      Head: Normocephalic and atraumatic.   Pulmonary:      Effort: Pulmonary effort is normal. No respiratory distress.   Neurological:      General: No focal deficit present.      Mental Status: He is alert and oriented to person, place, and time. Mental status is at baseline.   Psychiatric:         Mood and Affect: Mood normal.         Behavior: Behavior normal.         Thought Content: Thought content normal.         Judgment: Judgment normal.         Procedure:      Historical Data:    SOB/WHITLOCK-better  PAF-cont NOAC  HTN-cont amlodipine  PVD/CAD-cont RF mod  Hyperlipidemia-will go back to pravachol, recheck FLP in 6 weeks and if LDL is not below 50 will add zetia  Diverticulosis-increase fiber, ie Citrucel  Hx of TA-rescope in 6/2028  Hx of AV block-s/p PPM  GERD-cont PPI  Insomnia-add Ambien, advised of risk and benefits     Prior records and 2/12/25 ECHO noted      I spent 25 minutes in total including but not limited to the 20 minutes spent in direct conversation with this patient.        Current Outpatient Medications:     amLODIPine (Norvasc) 5 MG tablet, Take 1 tablet by mouth Daily., Disp: 90 tablet, Rfl: 1    ascorbic acid (VITAMIN C) 1000 MG tablet, Take 1 tablet by mouth Daily., Disp: 30 tablet, Rfl: 2    aspirin 81 MG EC tablet, Take 1 tablet by mouth Daily., Disp: 90 tablet, Rfl: 3    coenzyme Q10 100 MG capsule, Take 1 capsule by mouth Daily., Disp: , Rfl:     esomeprazole (nexIUM) 20 MG capsule, Take 1 capsule by mouth Every Morning Before Breakfast., Disp: 90 capsule, Rfl: 2    methocarbamol (ROBAXIN) 750 MG tablet, Take 1 tablet by mouth 4 (Four) Times a Day As Needed for Muscle Spasms., Disp: 120 tablet, Rfl: 0    Multivitamin tablet tablet, Take 1 tablet by mouth Daily., Disp: , Rfl:      Omega-3 Fatty Acids (fish oil) 1000 MG capsule capsule, Take 1 capsule by mouth Daily With Breakfast., Disp: , Rfl:     ondansetron ODT (ZOFRAN-ODT) 4 MG disintegrating tablet, Place 1 tablet on the tongue Every 8 (Eight) Hours As Needed for Nausea or Vomiting., Disp: 60 tablet, Rfl: 1    sertraline (ZOLOFT) 50 MG tablet, Take 1 tablet by mouth Daily., Disp: , Rfl:     VITAMIN D PO, Take 1 tablet by mouth Daily., Disp: , Rfl:     apixaban (ELIQUIS) 2.5 MG tablet tablet, Take 1 tablet by mouth 2 (Two) Times a Day., Disp: 60 tablet, Rfl: 5    fluticasone (FLONASE) 50 MCG/ACT nasal spray, Administer 2 sprays into the nostril(s) as directed by provider Daily. (Patient not taking: Reported on 6/10/2025), Disp: , Rfl:     pravastatin (PRAVACHOL) 40 MG tablet, Take 1 tablet by mouth Every Night. Fasting labs in 6-8 weeks, Disp: 90 tablet, Rfl: 3    zolpidem (AMBIEN) 10 MG tablet, 1/2-1 tablet at night as needed for sleep, Disp: 30 tablet, Rfl: 2        Diagnoses and all orders for this visit:    1. Primary hypertension (Primary)    2. Paroxysmal atrial fibrillation  -     apixaban (ELIQUIS) 2.5 MG tablet tablet; Take 1 tablet by mouth 2 (Two) Times a Day.  Dispense: 60 tablet; Refill: 5    3. Diverticulosis    4. Tubular adenoma    5. Hyperlipidemia LDL goal <50  -     pravastatin (PRAVACHOL) 40 MG tablet; Take 1 tablet by mouth Every Night. Fasting labs in 6-8 weeks  Dispense: 90 tablet; Refill: 3    6. Coronary artery disease involving native coronary artery of native heart without angina pectoris    7. Primary insomnia  -     zolpidem (AMBIEN) 10 MG tablet; 1/2-1 tablet at night as needed for sleep  Dispense: 30 tablet; Refill: 2

## 2025-06-20 ENCOUNTER — HOSPITAL ENCOUNTER (OUTPATIENT)
Dept: MRI IMAGING | Facility: HOSPITAL | Age: 75
Discharge: HOME | End: 2025-06-20
Payer: MEDICARE

## 2025-06-20 VITALS
OXYGEN SATURATION: 98 % | HEART RATE: 85 BPM | SYSTOLIC BLOOD PRESSURE: 160 MMHG | RESPIRATION RATE: 18 BRPM | DIASTOLIC BLOOD PRESSURE: 87 MMHG

## 2025-06-20 DIAGNOSIS — C64.1 RENAL CELL CARCINOMA, RIGHT: ICD-10-CM

## 2025-06-20 DIAGNOSIS — R41.0 CONFUSION: ICD-10-CM

## 2025-06-20 DIAGNOSIS — R26.81 UNSTABLE GAIT: ICD-10-CM

## 2025-06-20 PROCEDURE — A9577 INJ MULTIHANCE: HCPCS | Performed by: NURSE PRACTITIONER

## 2025-06-20 PROCEDURE — 25510000002 GADOBENATE DIMEGLUMINE 529 MG/ML SOLUTION: Performed by: NURSE PRACTITIONER

## 2025-06-20 PROCEDURE — 70553 MRI BRAIN STEM W/O & W/DYE: CPT

## 2025-06-20 RX ADMIN — GADOBENATE DIMEGLUMINE 20 ML: 529 INJECTION, SOLUTION INTRAVENOUS at 14:22

## 2025-06-30 ENCOUNTER — TELEPHONE (OUTPATIENT)
Dept: INTERNAL MEDICINE | Age: 75
End: 2025-06-30
Payer: MEDICARE

## 2025-06-30 NOTE — TELEPHONE ENCOUNTER
CARMEN Ganji CALLED AND ASKED FOR WRITTEN ORDERS FOR AN OVERNIGHT PULSE OXIMETRY TEST. THEY ASKED IF WE COULD FAX THE ORDER [phone], PLEASE ADVISE.

## 2025-07-08 ENCOUNTER — TELEPHONE (OUTPATIENT)
Dept: INTERNAL MEDICINE | Age: 75
End: 2025-07-08
Payer: MEDICARE

## 2025-07-08 NOTE — TELEPHONE ENCOUNTER
Caller: SHOSHANA    Relationship:     Best call back number: [phone]     What orders are you requesting (i.e. lab or imaging): PULSE OX FOR OVERNIGHT ORDER    In what timeframe would the patient need to come in: ASAP    Where will you receive your lab/imaging services: IN HOME    Additional notes: ORDERS

## (undated) DEVICE — IRRIGATOR BULB ASEPTO 60CC STRL

## (undated) DEVICE — INTRO TEAR AWAY/LVD W/SD PRT 6F 13CM

## (undated) DEVICE — CAUTERY TIP POLISHER: Brand: DEVON

## (undated) DEVICE — DRSNG SURG AQUACEL AG/ADVNTGE 9X15CM 3.5X6IN

## (undated) DEVICE — CATH DIAG EXPO M/ PK 6FR FL4/FR4 PIG 3PK

## (undated) DEVICE — TBG PENCL TELESCP MEGADYNE SMOKE EVAC 10FT

## (undated) DEVICE — MODEL BT2000 P/N 700287-012KIT CONTENTS: MANIFOLD WITH SALINE AND CONTRAST PORTS, SALINE TUBING WITH SPIKE AND HAND SYRINGE, TRANSDUCER: Brand: BT2000 AUTOMATED MANIFOLD KIT

## (undated) DEVICE — GUIDE CATHETER: Brand: MACH1™

## (undated) DEVICE — SOL NACL 0.9PCT 1000ML

## (undated) DEVICE — ST INF PRI SMRTSTE 20DRP 2VLV 24ML 117

## (undated) DEVICE — GW PERIPH GUIDERIGHT STD/EXCHNG/J/TIP SS 0.035IN 5X260CM

## (undated) DEVICE — ELECTRD RETRN/GRND MEGADYNE SGL/PLT W/CORD 9FT DISP

## (undated) DEVICE — CANN NASL CO2 DIVIDED A/

## (undated) DEVICE — NDL PERC 1PART ECHOTIP WO/BASEPLT 18G 7CM

## (undated) DEVICE — PK CATH CARD 10

## (undated) DEVICE — COPILOT BLEEDBACK CONTROL VALVE: Brand: COPILOT

## (undated) DEVICE — MEDI-VAC YANKAUER SUCTION HANDLE W/BULBOUS TIP: Brand: CARDINAL HEALTH

## (undated) DEVICE — CATH DIAG IMPULSE FL3.5 6F 100CM

## (undated) DEVICE — ADULT, W/LG. BACK PAD, RADIOTRANSPARENT ELEMENT AND LEAD WIRE COMPATIBLE W/: Brand: DEFIBRILLATION ELECTRODES

## (undated) DEVICE — TRAP FLD MINIVAC MEGADYNE 100ML

## (undated) DEVICE — CVR PROB ULTRASND/TRANSD W/GEL 7X11IN STRL

## (undated) DEVICE — SET PRIMARY GRVTY 10DP MALE LL 104IN

## (undated) DEVICE — LEX ELECTRO PHYSIOLOGY: Brand: MEDLINE INDUSTRIES, INC.

## (undated) DEVICE — TUBING, SUCTION, 1/4" X 10', STRAIGHT: Brand: MEDLINE

## (undated) DEVICE — LIMB HOLDER, WRIST/ANKLE: Brand: DEROYAL

## (undated) DEVICE — DECANT BG O JET

## (undated) DEVICE — GLIDESHEATH BASIC HYDROPHILIC COATED INTRODUCER SHEATH: Brand: GLIDESHEATH

## (undated) DEVICE — MODEL AT P65, P/N 701554-001KIT CONTENTS: HAND CONTROLLER, 3-WAY HIGH-PRESSURE STOPCOCK WITH ROTATING END AND PREMIUM HIGH-PRESSURE TUBING: Brand: ANGIOTOUCH® KIT

## (undated) DEVICE — TR BAND RADIAL ARTERY COMPRESSION DEVICE: Brand: TR BAND

## (undated) DEVICE — ADULT, W/LG. BACK PAD, RADIOTRANSPARENT ELEMENT AND LEAD WIRE: Brand: DEFIBRILLATION ELECTRODES